# Patient Record
Sex: MALE | Race: WHITE | URBAN - METROPOLITAN AREA
[De-identification: names, ages, dates, MRNs, and addresses within clinical notes are randomized per-mention and may not be internally consistent; named-entity substitution may affect disease eponyms.]

---

## 2018-12-17 ENCOUNTER — HOSPITAL ENCOUNTER (OUTPATIENT)
Dept: ULTRASOUND IMAGING | Age: 53
Discharge: HOME | End: 2018-12-17
Payer: MEDICARE

## 2018-12-17 ENCOUNTER — HOSPITAL ENCOUNTER (OUTPATIENT)
Dept: WOMENS IMAGING | Age: 53
Discharge: HOME | End: 2018-12-17
Payer: MEDICARE

## 2018-12-17 DIAGNOSIS — N64.4 BREAST TENDERNESS IN MALE: ICD-10-CM

## 2018-12-17 DIAGNOSIS — N63.20 LEFT BREAST LUMP: ICD-10-CM

## 2018-12-17 DIAGNOSIS — N63.0 LUMP OR MASS IN BREAST: ICD-10-CM

## 2018-12-17 PROCEDURE — 76642 ULTRASOUND BREAST LIMITED: CPT

## 2018-12-17 PROCEDURE — G0279 TOMOSYNTHESIS, MAMMO: HCPCS

## 2021-02-02 PROBLEM — I10 ESSENTIAL HYPERTENSION: Status: ACTIVE | Noted: 2021-02-02

## 2021-02-02 PROBLEM — E78.5 HYPERLIPIDEMIA: Status: ACTIVE | Noted: 2021-02-02

## 2021-02-02 NOTE — PROGRESS NOTES
[facility]   Cardiac Evaluation      Patient: Mark Grimaldo  YOB: 1965         Chief Complaint   Patient presents with    New Patient        Referring provider: No primary care provider on file. History of Present Illness:  Mr Nathan Garcia is a 63 y.o. male here as a new patient. He has a PMH of Htn, Hld,  He has a family history of heart disease in his father and sister, and Htn in his sister. He is a current every day smoker. Today, Mr Nathan Garcia is here for concerns of erectile dysfunction. He wants to take Viagra/Cialis and his PCP wants cardiac evaluation prior to prescribing it. He has h/o Coronary angiogram in [place] [address] many years ago that showed mild CAD. He denies exertional chest pain, SOB/BARRERA, PND, palpitations, light-headedness, or edema. With regard to medication therapy he/she has been compliant with prescribed regimen and has tolerated therapy to date. Past Medical History:   has no past medical history on file. Surgical History:   has a past surgical history that includes Pancreas surgery; Lithotripsy; and Splenectomy, total.     Current Outpatient Medications   Medication Sig Dispense Refill    lisinopril (PRINIVIL;ZESTRIL) 10 MG tablet       vitamin D (CHOLECALCIFEROL) 250 MCG (89825 UT) CAPS capsule Take 10,000 Units by mouth       No current facility-administered medications for this visit. Allergies:  Patient has no known allergies.      Social History:  Social History     Socioeconomic History    Marital status:      Spouse name: Not on file    Number of children: Not on file    Years of education: Not on file    Highest education level: Not on file   Occupational History    Not on file   Social Needs    Financial resource strain: Not on file    Food insecurity     Worry: Not on file     Inability: Not on file    Transportation needs     Medical: Not on file     Non-medical: Not on file   Tobacco Use    Smoking status: Current Every Day Smoker     Packs/day: 0.50     Years: 35.00     Pack years: 17.50     Types: Cigarettes    Smokeless tobacco: Never Used   Substance and Sexual Activity    Alcohol use: Never     Frequency: Never     Binge frequency: Never    Drug use: Yes     Types: Marijuana    Sexual activity: Not on file   Lifestyle    Physical activity     Days per week: Not on file     Minutes per session: Not on file    Stress: Not on file   Relationships    Social connections     Talks on phone: Not on file     Gets together: Not on file     Attends Hoahaoism service: Not on file     Active member of club or organization: Not on file     Attends meetings of clubs or organizations: Not on file     Relationship status: Not on file    Intimate partner violence     Fear of current or ex partner: Not on file     Emotionally abused: Not on file     Physically abused: Not on file     Forced sexual activity: Not on file   Other Topics Concern    Not on file   Social History Narrative    Not on file       Family History:   Family History   Problem Relation Age of Onset    OCD Mother     Hypertension Mother     No Known Problems Father     No Known Problems Sister     No Known Problems Brother     No Known Problems Sister     No Known Problems Sister      Family history has been reviewed and not pertinent except as noted above. Review of Systems:   · Constitutional: there has been no unanticipated weight loss. No change in energy or activity level   · Eyes: No visual changes   · ENT: No Headaches, hearing loss or vertigo. No mouth sores or sore throat. · Cardiovascular: Reviewed in HPI  · Respiratory: No cough or wheezing, no sputum production. · Gastrointestinal: No abdominal pain, appetite loss, blood in stools. No change in bowel or bladder habits. · Genitourinary: No nocturia, dysuria, trouble voiding  · Musculoskeletal:  No gait disturbance, weakness or joint complaints.   · Integumentary: No rash or pruritis. · Neurological: No headache, change in muscle strength, numbness or tingling. No change in gait, balance, coordination, mood, affect, memory, mentation, behavior. · Psychiatric: No anxiety or depression  · Endocrine: No malaise or fever  · Hematologic/Lymphatic: No abnormal bruising or bleeding, blood clots or swollen lymph nodes. · Allergic/Immunologic: No nasal congestion or hives. Physical Examination:    Vitals:    02/12/21 0926   BP: 126/88   Site: Right Upper Arm   Position: Sitting   Cuff Size: Medium Adult   Pulse: 85   SpO2: 95%   Weight: 145 lb (65.8 kg)   Height: 5' 10\" (1.778 m)     Body mass index is 20.81 kg/m². Wt Readings from Last 3 Encounters:   02/12/21 145 lb (65.8 kg)      BP Readings from Last 3 Encounters:   02/12/21 126/88        Physical Examination:    · CONSTITUTIONAL: Well developed, well nourished  · EYES: PERRLA. No xanthelasma, sclera non icteric  · EARS,NOSE,MOUTH,THROAT:  Mucous membranes moist, normal hearing  · NECK: Supple, JVP normal, thyroid not enlarged. Carotids 2+ without bruits  · RESPIRATORY: Normal effort, no rales or rhonchi  · CARDIOVASCULAR: Normal PMI, regular rate and rhythm, no murmurs, rub or gallop. No edema. Radial pulses present and equal  · CHEST: No scar or masses  · ABDOMEN: Normal bowel sounds. No masses or tenderness. No bruit  · MUSCULOSKELETAL: No clubbing or cyanosis. Moves all extremities well. Normal gait  · SKIN:  Warm and dry. No rashes  · NEUROLOGIC: Cranial nerves intact. Alert and oriented  · PSYCHIATRIC: Calm affect. Appears to have normal judgement and insight    All testing and labs listed below were personally reviewed by myself. Assessment/Plan  1. Erectile dysfunction, unspecified erectile dysfunction type    2. Essential hypertension    3.  Hyperlipidemia, unspecified hyperlipidemia type          Essential hypertension  Controlled  Meds~ lisinopril  Plan~ continue medications    Hyperlipidemia  LDL~ 61  (TG 74; HDL 38; )   Date of last lipid panel~ 1/2020  Meds~ untreated  Plan~ stable    Erectile dysfunction  Pt concerned for ED and pcp would like clearance prior to starting on medications. Plan~ GXT exercise only      Orders Placed This Encounter   Procedures   Kristin Magallanes MD      Thank you for allowing to me to participate in the care of [place]. Scribe's Attestation: This note was scribed in the presence of Dr. Claudia López MD by Kya Batista RN.    I, Dr. Claudia López, personally performed the services described in this documentation, as scribed by the above signed scribe in my presence. It is both accurate and complete to my knowledge. I agree with the details independently gathered by the clinical support staff, while the remaining scribed note accurately describes my personal service to the patient.

## 2021-02-12 ENCOUNTER — OFFICE VISIT (OUTPATIENT)
Dept: CARDIOLOGY CLINIC | Age: 56
End: 2021-02-12
Payer: MEDICARE

## 2021-02-12 VITALS
DIASTOLIC BLOOD PRESSURE: 88 MMHG | WEIGHT: 145 LBS | HEART RATE: 85 BPM | OXYGEN SATURATION: 95 % | BODY MASS INDEX: 20.76 KG/M2 | SYSTOLIC BLOOD PRESSURE: 126 MMHG | HEIGHT: 70 IN

## 2021-02-12 DIAGNOSIS — N52.9 ERECTILE DYSFUNCTION, UNSPECIFIED ERECTILE DYSFUNCTION TYPE: Primary | ICD-10-CM

## 2021-02-12 DIAGNOSIS — E78.5 HYPERLIPIDEMIA, UNSPECIFIED HYPERLIPIDEMIA TYPE: ICD-10-CM

## 2021-02-12 DIAGNOSIS — I10 ESSENTIAL HYPERTENSION: ICD-10-CM

## 2021-02-12 PROBLEM — I25.10 CORONARY ARTERY DISEASE INVOLVING NATIVE CORONARY ARTERY OF NATIVE HEART WITHOUT ANGINA PECTORIS: Status: RESOLVED | Noted: 2021-02-12 | Resolved: 2021-02-12

## 2021-02-12 PROBLEM — I25.10 CORONARY ARTERY DISEASE INVOLVING NATIVE CORONARY ARTERY OF NATIVE HEART WITHOUT ANGINA PECTORIS: Status: ACTIVE | Noted: 2021-02-12

## 2021-02-12 PROCEDURE — 99204 OFFICE O/P NEW MOD 45 MIN: CPT | Performed by: INTERNAL MEDICINE

## 2021-02-12 RX ORDER — LISINOPRIL 10 MG/1
TABLET ORAL
COMMUNITY
Start: 2021-01-12 | End: 2022-09-06 | Stop reason: SDUPTHER

## 2021-02-12 SDOH — HEALTH STABILITY: MENTAL HEALTH: HOW MANY STANDARD DRINKS CONTAINING ALCOHOL DO YOU HAVE ON A TYPICAL DAY?: NOT ASKED

## 2021-02-12 NOTE — ASSESSMENT & PLAN NOTE
Pt concerned for ED and pcp would like clearance prior to starting on medications. History of non obstructive CAD. Can achieve>4mets.      Plan~ GXT exercise only

## 2021-02-19 ENCOUNTER — TELEPHONE (OUTPATIENT)
Dept: CARDIOLOGY CLINIC | Age: 56
End: 2021-02-19

## 2021-02-19 NOTE — TELEPHONE ENCOUNTER
Central sched states cardiac stress test needs a new order placed with new dx. States one listed is not being accepted by  ins. Any questions please call.

## 2021-02-22 DIAGNOSIS — R06.02 SOB (SHORTNESS OF BREATH): Primary | ICD-10-CM

## 2021-06-21 ENCOUNTER — OFFICE VISIT (OUTPATIENT)
Dept: PRIMARY CARE CLINIC | Age: 56
End: 2021-06-21
Payer: MEDICARE

## 2021-06-21 VITALS
HEIGHT: 70 IN | OXYGEN SATURATION: 96 % | SYSTOLIC BLOOD PRESSURE: 130 MMHG | BODY MASS INDEX: 20.16 KG/M2 | WEIGHT: 140.8 LBS | DIASTOLIC BLOOD PRESSURE: 74 MMHG | HEART RATE: 62 BPM | TEMPERATURE: 97.1 F

## 2021-06-21 DIAGNOSIS — I73.9 CLAUDICATION (HCC): ICD-10-CM

## 2021-06-21 DIAGNOSIS — L84 CORN OR CALLUS: ICD-10-CM

## 2021-06-21 DIAGNOSIS — I10 ESSENTIAL HYPERTENSION, BENIGN: Primary | ICD-10-CM

## 2021-06-21 PROBLEM — N52.9 ERECTILE DYSFUNCTION: Status: RESOLVED | Noted: 2021-02-12 | Resolved: 2021-06-21

## 2021-06-21 PROCEDURE — 99212 OFFICE O/P EST SF 10 MIN: CPT | Performed by: FAMILY MEDICINE

## 2021-06-21 RX ORDER — ASPIRIN 81 MG/1
81 TABLET ORAL DAILY
COMMUNITY

## 2021-06-21 RX ORDER — AMLODIPINE BESYLATE 10 MG/1
10 TABLET ORAL DAILY
COMMUNITY
End: 2022-09-06 | Stop reason: SDUPTHER

## 2021-06-21 RX ORDER — ATORVASTATIN CALCIUM 80 MG/1
80 TABLET, FILM COATED ORAL DAILY
COMMUNITY
Start: 2021-04-06 | End: 2022-09-23 | Stop reason: SDUPTHER

## 2021-06-21 ASSESSMENT — LIFESTYLE VARIABLES: HOW OFTEN DO YOU HAVE A DRINK CONTAINING ALCOHOL: 0

## 2021-06-21 ASSESSMENT — PATIENT HEALTH QUESTIONNAIRE - PHQ9
SUM OF ALL RESPONSES TO PHQ QUESTIONS 1-9: 0
SUM OF ALL RESPONSES TO PHQ QUESTIONS 1-9: 0
2. FEELING DOWN, DEPRESSED OR HOPELESS: 0
SUM OF ALL RESPONSES TO PHQ QUESTIONS 1-9: 0
1. LITTLE INTEREST OR PLEASURE IN DOING THINGS: 0
SUM OF ALL RESPONSES TO PHQ9 QUESTIONS 1 & 2: 0

## 2021-06-21 NOTE — PROGRESS NOTES
PROGRESS NOTE  Date of Service:  6/21/2021    Chief Complaint   Patient presents with    Hypertension     follow-up        SUBJECTIVE:  Patient ID: Chas Mendez is a 63 y.o. male to reestablish care with me. Has history of hypertension, hyperlipidemia, nonobstructive CAD and COPD. Still smokes a pack a day. HPI:   Patient is 67-year-old here for follow-up. Being treated for hypertension and hyperlipidemia. Also has nonobstructive CAD and COPD. Still smokes and no desire to quit at this time. Recently seen his cardiologist on 5/20/2021 and cardiac stable. Recommend to resume medications. Patient been complaining of pain at the bottom of his right foot. Not much exercise due to pain with ambulation. Patient has not been checked for PAD. Denies chest pains or shortness of breath. Has off-and-on cough due to his COPD and smoking. 2D echo done on 5/18/2021: Estimated ejection fraction 55 to 60%, wall motion was normal.  Mild mitral valve and aortic valve regurgitation.     Reviewed blood test done on 7/3/2020: Glucose 98, creatinine 1.03, estimated GFR 81, calcium 8.8, vitamin D 18.2  Blood test done on January 6, 2020: CBC within normal limits, glucose 94, creatinine 1.02, potassium 4.3, calcium 8.8, total cholesterol 114, triglycerides 74, HDL 38, LDL 61, TSH 1.75    Past Medical History:   Diagnosis Date    Acute pancreatitis 2005    Breast mass, left 12/17/2018    Chronic low back pain     History of chicken pox     Hyperlipidemia     Hypertension     Kidney stones 1990      Past Surgical History:   Procedure Laterality Date    CYSTOSCOPY  04/12/2012    With right ureteral stent due to kidney stone by Dr. 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 Return in about 3 weeks (around 7/12/2021). Electronically signed by Meg Bishop MD on 6/21/21 at 5:33 PM.     This dictation was generated by voice recognition computer software. Although all attempts are made to edit the dictation for accuracy, there may be errors in the transcription that are not intended.

## 2021-07-12 ENCOUNTER — OFFICE VISIT (OUTPATIENT)
Dept: PRIMARY CARE CLINIC | Age: 56
End: 2021-07-12
Payer: MEDICARE

## 2021-07-12 VITALS
HEIGHT: 71 IN | BODY MASS INDEX: 19.8 KG/M2 | HEART RATE: 74 BPM | OXYGEN SATURATION: 96 % | TEMPERATURE: 97.1 F | WEIGHT: 141.4 LBS | SYSTOLIC BLOOD PRESSURE: 134 MMHG | DIASTOLIC BLOOD PRESSURE: 74 MMHG

## 2021-07-12 DIAGNOSIS — E55.9 VITAMIN D DEFICIENCY: ICD-10-CM

## 2021-07-12 DIAGNOSIS — I10 ESSENTIAL HYPERTENSION: ICD-10-CM

## 2021-07-12 DIAGNOSIS — Z72.89 OTHER PROBLEMS RELATED TO LIFESTYLE: ICD-10-CM

## 2021-07-12 DIAGNOSIS — E78.2 MIXED HYPERLIPIDEMIA: ICD-10-CM

## 2021-07-12 DIAGNOSIS — Z00.00 MEDICARE ANNUAL WELLNESS VISIT, SUBSEQUENT: Primary | ICD-10-CM

## 2021-07-12 DIAGNOSIS — Z00.00 MEDICARE ANNUAL WELLNESS VISIT, SUBSEQUENT: ICD-10-CM

## 2021-07-12 LAB
A/G RATIO: 1.7 (ref 1.1–2.2)
ALBUMIN SERPL-MCNC: 3.9 G/DL (ref 3.4–5)
ALP BLD-CCNC: 60 U/L (ref 40–129)
ALT SERPL-CCNC: 12 U/L (ref 10–40)
ANION GAP SERPL CALCULATED.3IONS-SCNC: 10 MMOL/L (ref 3–16)
AST SERPL-CCNC: 23 U/L (ref 15–37)
BASOPHILS ABSOLUTE: 0.1 K/UL (ref 0–0.2)
BASOPHILS RELATIVE PERCENT: 0.6 %
BILIRUB SERPL-MCNC: 0.3 MG/DL (ref 0–1)
BUN BLDV-MCNC: 11 MG/DL (ref 7–20)
CALCIUM SERPL-MCNC: 8.9 MG/DL (ref 8.3–10.6)
CHLORIDE BLD-SCNC: 108 MMOL/L (ref 99–110)
CHOLESTEROL, TOTAL: 125 MG/DL (ref 0–199)
CO2: 25 MMOL/L (ref 21–32)
CREAT SERPL-MCNC: 0.9 MG/DL (ref 0.9–1.3)
EOSINOPHILS ABSOLUTE: 0.1 K/UL (ref 0–0.6)
EOSINOPHILS RELATIVE PERCENT: 1.1 %
GFR AFRICAN AMERICAN: >60
GFR NON-AFRICAN AMERICAN: >60
GLOBULIN: 2.3 G/DL
GLUCOSE BLD-MCNC: 99 MG/DL (ref 70–99)
HCT VFR BLD CALC: 42.9 % (ref 40.5–52.5)
HDLC SERPL-MCNC: 44 MG/DL (ref 40–60)
HEMOGLOBIN: 14.4 G/DL (ref 13.5–17.5)
LDL CHOLESTEROL CALCULATED: 64 MG/DL
LYMPHOCYTES ABSOLUTE: 2.5 K/UL (ref 1–5.1)
LYMPHOCYTES RELATIVE PERCENT: 27.9 %
MCH RBC QN AUTO: 33.5 PG (ref 26–34)
MCHC RBC AUTO-ENTMCNC: 33.4 G/DL (ref 31–36)
MCV RBC AUTO: 100.1 FL (ref 80–100)
MONOCYTES ABSOLUTE: 0.9 K/UL (ref 0–1.3)
MONOCYTES RELATIVE PERCENT: 9.9 %
NEUTROPHILS ABSOLUTE: 5.4 K/UL (ref 1.7–7.7)
NEUTROPHILS RELATIVE PERCENT: 60.5 %
PDW BLD-RTO: 15.1 % (ref 12.4–15.4)
PLATELET # BLD: 194 K/UL (ref 135–450)
PMV BLD AUTO: 11.5 FL (ref 5–10.5)
POTASSIUM SERPL-SCNC: 3.9 MMOL/L (ref 3.5–5.1)
RBC # BLD: 4.29 M/UL (ref 4.2–5.9)
SODIUM BLD-SCNC: 143 MMOL/L (ref 136–145)
TOTAL PROTEIN: 6.2 G/DL (ref 6.4–8.2)
TRIGL SERPL-MCNC: 84 MG/DL (ref 0–150)
VITAMIN D 25-HYDROXY: 33.6 NG/ML
VLDLC SERPL CALC-MCNC: 17 MG/DL
WBC # BLD: 8.9 K/UL (ref 4–11)

## 2021-07-12 PROCEDURE — G0439 PPPS, SUBSEQ VISIT: HCPCS | Performed by: FAMILY MEDICINE

## 2021-07-12 ASSESSMENT — PATIENT HEALTH QUESTIONNAIRE - PHQ9
SUM OF ALL RESPONSES TO PHQ QUESTIONS 1-9: 0
1. LITTLE INTEREST OR PLEASURE IN DOING THINGS: 0
2. FEELING DOWN, DEPRESSED OR HOPELESS: 0
SUM OF ALL RESPONSES TO PHQ9 QUESTIONS 1 & 2: 0
SUM OF ALL RESPONSES TO PHQ QUESTIONS 1-9: 0
SUM OF ALL RESPONSES TO PHQ QUESTIONS 1-9: 0

## 2021-07-12 ASSESSMENT — LIFESTYLE VARIABLES: HOW OFTEN DO YOU HAVE A DRINK CONTAINING ALCOHOL: 0

## 2021-07-12 NOTE — PATIENT INSTRUCTIONS
Personalized Preventive Plan for Karolyn Barbosa - 7/12/2021  Medicare offers a range of preventive health benefits. Some of the tests and screenings are paid in full while other may be subject to a deductible, co-insurance, and/or copay. Some of these benefits include a comprehensive review of your medical history including lifestyle, illnesses that may run in your family, and various assessments and screenings as appropriate. After reviewing your medical record and screening and assessments performed today your provider may have ordered immunizations, labs, imaging, and/or referrals for you. A list of these orders (if applicable) as well as your Preventive Care list are included within your After Visit Summary for your review. Other Preventive Recommendations:    · A preventive eye exam performed by an eye specialist is recommended every 1-2 years to screen for glaucoma; cataracts, macular degeneration, and other eye disorders. · A preventive dental visit is recommended every 6 months. · Try to get at least 150 minutes of exercise per week or 10,000 steps per day on a pedometer . · Order or download the FREE \"Exercise & Physical Activity: Your Everyday Guide\" from [employer]. Call 5-406.814.1573 or search [employer] online. · You need 7855-3093 mg of calcium and 9221-1500 IU of vitamin D per day. It is possible to meet your calcium requirement with diet alone, but a vitamin D supplement is usually necessary to meet this goal.  · When exposed to the sun, use a sunscreen that protects against both UVA and UVB radiation with an SPF of 30 or greater. Reapply every 2 to 3 hours or after sweating, drying off with a towel, or swimming. · Always wear a seat belt when traveling in a car. Always wear a helmet when riding a bicycle or motorcycle.

## 2021-07-12 NOTE — PROGRESS NOTES
2005       Family History   Problem Relation Age of Onset    Hypertension Mother     Diabetes Mother     COPD Mother     No Known Problems Father     No Known Problems Sister     No Known Problems Sister     No Known Problems Sister     No Known Problems Brother        CareTeam (Including outside providers/suppliers regularly involved in providing care):   Patient Care Team:  Sabino Perry MD as PCP - General (Family Medicine)  Sabino Perry MD as PCP - [facility] Empaneled Provider  Vandana Cheung MD as Consulting Physician (Cardiology)    Wt Readings from Last 3 Encounters:   07/12/21 141 lb 6.4 oz (64.1 kg)   06/21/21 140 lb 12.8 oz (63.9 kg)   02/12/21 145 lb (65.8 kg)     /74 (Site: Left Upper Arm, Position: Sitting, Cuff Size: Medium Adult)   Pulse 74   Temp 97.1 °F (36.2 °C) (Infrared)   Ht 5' 11\" (1.803 m)   Wt 141 lb 6.4 oz (64.1 kg)   SpO2 96%   BMI 19.72 kg/m²      Based upon direct observation of the patient, evaluation of cognition reveals recent and remote memory intact. Review of Systems    Physical Exam     Patient's complete Health Risk Assessment and screening values have been reviewed and are found in Flowsheets. The following problems were reviewed today and where indicated follow up appointments were made and/or referrals ordered. Positive Risk Factor Screenings with Interventions:      Cognitive: Words recalled: 1 Word Recalled  Clock Drawing Test (CDT) Score: (!) Abnormal  Total Score Interpretation: Positive Mini-Cog  Did the patient refuse to take the cognition test?: No  Cognitive Impairment Interventions:  · Memory still intact, no intervention needed at this time. We will continue to monitor.       Substance History:  Social History     Tobacco History     Smoking Status  Current Every Day Smoker Smoking Frequency  0.5 packs/day for 35 years (17.5 pk yrs) Smoking Tobacco Type  Cigarettes    Smokeless Tobacco Use  Never Used          Alcohol History     Alcohol Use Status  Never          Drug Use     Drug Use Status  Yes Types  Marijuana          Sexual Activity     Sexually Active  Not Asked               Alcohol Screening:       A score of 8 or more is associated with harmful or hazardous drinking. A score of 13 or more in women, and 15 or more in men, is likely to indicate alcohol dependence. Substance Abuse Interventions:  · Patient is working on quitting smoking soon. General Health and ACP:  General  In general, how would you say your health is?: Fair  In the past 7 days, have you experienced any of the following? New or Increased Pain, New or Increased Fatigue, Loneliness, Social Isolation, Stress or Anger?: None of These  Do you get the social and emotional support that you need?: Yes  Do you have a Living Will?: (!) No  Advance Directives     Power of [facility] Will ACP-Advance Directive ACP-Power of     Not on File Not on File Not on File Not on File      General Health Risk Interventions:  · Gets physical, social and emotional support from family especially his wife. Health Habits/Nutrition:  Health Habits/Nutrition  Do you exercise for at least 20 minutes 2-3 times per week?: Yes  Have you lost any weight without trying in the past 3 months?: No  Do you eat only one meal per day?: No  Have you seen the dentist within the past year?: (!) No  Body mass index: 19.72  Health Habits/Nutrition Interventions:  · Not much activity due to pain. Hearing/Vision:  No exam data present  Hearing/Vision  Do you or your family notice any trouble with your hearing that hasn't been managed with hearing aids?: No  Do you have difficulty driving, watching TV, or doing any of your daily activities because of your eyesight?: (!) Yes  Have you had an eye exam within the past year?: (!) No  Hearing/Vision Interventions:  · No hearing and vision concerns other than more likely just need reading glasses.         Personalized Preventive Plan   Current Health Maintenance Status  Immunization History   Administered Date(s) Administered    COVID-19, Moderna, PF, 100mcg/0.5mL 07/01/2021    Influenza Virus Vaccine 10/20/2010, 11/23/2016, 01/12/2021    Influenza, Quadv, IM, PF (6 mo and older Fluzone, Flulaval, Fluarix, and 3 yrs and older Afluria) 12/05/2017    Pneumococcal Conjugate 13-valent ([id number]) 12/05/2017    Pneumococcal Polysaccharide ([id number]) 10/05/2010, 10/20/2010    Tdap (Boostrix, Adacel) 01/29/2008, 12/14/2013        Health Maintenance   Topic Date Due    Potassium monitoring  Never done    Creatinine monitoring  Never done    Hepatitis C screen  Never done    Meningococcal (ACWY) vaccine (1 - Risk start before 7 months 4-dose series) Never done    Hib vaccine (1 of 1 - Risk 1-dose series) Never done    Lipid screen  Never done    Meningococcal B vaccine (1 of 4 - Increased Risk Bexsero 2-dose series) Never done    HIV screen  Never done    Colon cancer screen colonoscopy  Never done    Shingles Vaccine (1 of 2) Never done   [employer] Visit (AWV)  Never done    COVID-19 Vaccine (2 - Moderna 2-dose series) 07/29/2021    Flu vaccine (1) 09/01/2021    DTaP/Tdap/Td vaccine (3 - Td or Tdap) 12/14/2023    Pneumococcal 0-64 years Vaccine (4 of 4 - PPSV23) 12/04/2029    Hepatitis A vaccine  Aged Out    Hepatitis B vaccine  Aged Out     Recommendations for [employer] Due: see orders and patient instructions/AVS.    1. Medicare annual wellness visit, subsequent    - CBC Auto Differential; Future  - Comprehensive Metabolic Panel; Future  - Lipid Panel; Future    2. Essential hypertension  Continue current medications. Discussed goal of blood pressure to keep it under 140/90. On amlodipine 10 mg daily and lisinopril 10 mg daily. - Comprehensive Metabolic Panel; Future    3. Mixed hyperlipidemia  Takes Lipitor 80 mg daily. Goal of LDL to keep under 80.  - Comprehensive Metabolic Panel; Future  - Lipid Panel; Future    4. Vitamin D deficiency  Takes vitamin D supplement 10,000 units daily. Check level to make sure it is not overcorrected. Had hepatitis C antibody screen on 5/23/2017 and that was negative. - Vitamin D 25 Hydroxy; Future    5. Other problems related to lifestyle  HIV check. - [place] HEP C SCREEN HIGH RISK/OTHER []  - HIV Screen; Future    Medications reviewed and updated. Will call with test results. Continue current medications. Follow-up in 6 months for hypertension, follow-up in a year for Medicare annual wellness. Return in 6 months (on 1/12/2022) for Medicare Annual Wellness Visit in 1 year.     Recommended screening schedule for the next 5-10 years is provided to the patient in written form: see Patient Instructions/AVS.    Electronically signed by Bhakti Pierson MD on 7/12/2021 at 12:35 PM.

## 2021-07-13 LAB
HIV AG/AB: NORMAL
HIV ANTIGEN: NORMAL
HIV-1 ANTIBODY: NORMAL
HIV-2 AB: NORMAL

## 2021-07-16 DIAGNOSIS — I10 ESSENTIAL HYPERTENSION: ICD-10-CM

## 2021-07-16 DIAGNOSIS — E55.9 VITAMIN D DEFICIENCY: Primary | ICD-10-CM

## 2021-07-16 NOTE — TELEPHONE ENCOUNTER
Please sign and send to pharmacy. . PT requested Vitamin D refill. Requested Prescriptions     Pending Prescriptions Disp Refills    vitamin D (CHOLECALCIFEROL) 250 MCG (47649 UT) CAPS capsule 30 capsule 0     Sig: Take 1 capsule by mouth daily   .

## 2022-09-06 DIAGNOSIS — I10 ESSENTIAL HYPERTENSION: ICD-10-CM

## 2022-09-06 RX ORDER — AMLODIPINE BESYLATE 10 MG/1
10 TABLET ORAL DAILY
Qty: 30 TABLET | Refills: 3 | Status: SHIPPED | OUTPATIENT
Start: 2022-09-06 | End: 2022-09-09

## 2022-09-06 RX ORDER — LISINOPRIL 10 MG/1
10 TABLET ORAL DAILY
Qty: 30 TABLET | Refills: 3 | Status: SHIPPED | OUTPATIENT
Start: 2022-09-06 | End: 2022-09-09 | Stop reason: SDUPTHER

## 2022-09-06 NOTE — TELEPHONE ENCOUNTER
Pt is calling asking if he would need to be seen or if he could get a medication called in for his blood pressure  pt states he has high blood pressure and has ran out of medication last week   Pt does not recall the name of the medication he usually takes

## 2022-09-06 NOTE — TELEPHONE ENCOUNTER
Send a prescription for lisinopril 10 mg daily and amlodipine 10 mg daily for 30 days and he is due for follow-up, last seen July 2021.

## 2022-09-09 ENCOUNTER — OFFICE VISIT (OUTPATIENT)
Dept: PRIMARY CARE CLINIC | Age: 57
End: 2022-09-09
Payer: MEDICARE

## 2022-09-09 VITALS
HEART RATE: 87 BPM | DIASTOLIC BLOOD PRESSURE: 60 MMHG | BODY MASS INDEX: 18.69 KG/M2 | WEIGHT: 134 LBS | SYSTOLIC BLOOD PRESSURE: 102 MMHG | TEMPERATURE: 98.1 F | OXYGEN SATURATION: 96 %

## 2022-09-09 DIAGNOSIS — I73.9 CLAUDICATION OF BOTH LOWER EXTREMITIES (HCC): ICD-10-CM

## 2022-09-09 DIAGNOSIS — Z00.00 MEDICARE ANNUAL WELLNESS VISIT, SUBSEQUENT: Primary | ICD-10-CM

## 2022-09-09 DIAGNOSIS — I10 ESSENTIAL HYPERTENSION: ICD-10-CM

## 2022-09-09 DIAGNOSIS — E78.2 MIXED HYPERLIPIDEMIA: ICD-10-CM

## 2022-09-09 PROCEDURE — G0439 PPPS, SUBSEQ VISIT: HCPCS | Performed by: FAMILY MEDICINE

## 2022-09-09 RX ORDER — LISINOPRIL 10 MG/1
10 TABLET ORAL DAILY
Qty: 90 TABLET | Refills: 3 | Status: SHIPPED | OUTPATIENT
Start: 2022-09-09

## 2022-09-09 ASSESSMENT — PATIENT HEALTH QUESTIONNAIRE - PHQ9
SUM OF ALL RESPONSES TO PHQ9 QUESTIONS 1 & 2: 0
SUM OF ALL RESPONSES TO PHQ QUESTIONS 1-9: 0
2. FEELING DOWN, DEPRESSED OR HOPELESS: 0
SUM OF ALL RESPONSES TO PHQ QUESTIONS 1-9: 0
1. LITTLE INTEREST OR PLEASURE IN DOING THINGS: 0

## 2022-09-09 ASSESSMENT — LIFESTYLE VARIABLES
HOW MANY STANDARD DRINKS CONTAINING ALCOHOL DO YOU HAVE ON A TYPICAL DAY: PATIENT DOES NOT DRINK
HOW OFTEN DO YOU HAVE A DRINK CONTAINING ALCOHOL: NEVER

## 2022-09-09 NOTE — PROGRESS NOTES
Chief Complaint   Patient presents with    Medicare [address]     Medicare Annual Wellness Visit  Name: Endy Lacey Date: 2022   MRN: [id number] Sex: Male   Age: 61 y.o. Ethnicity: Non- / Non    : 1965 Race: White (non-)      Man Schreiber is here for Medicare AWV     Patient is 58-year-old male here for his Medicare AWV. He is being treated for hyperlipidemia and hypertension. Takes lisinopril and aspirin. Took himself off Lipitor a year ago. Patient continues to have pain on his leg with ambulation and at rest.  Had RICHAR done on 2021: 0.55 on both legs. Patient was not able to follow-up sooner, his mother  Dashawn Barbosa) in 2021. Reviewed blood test done on 2021: Total cholesterol 125, triglyceride 84, HDL 44, LDL 64, vitamin D33.6, HIV nonreactive, sodium 143, potassium 3.9, chloride 108, glucose 99, BUN 11, creatinine 0.9, calcium 8.9, ALT 12, AST 23, CBC unremarkable    Screenings for behavioral, psychosocial and functional/safety risks, and cognitive dysfunction are all negative except as indicated below. These results, as well as other patient data from the [address] form, are documented in Flowsheets linked to this Encounter. Patient is a 58-year-old male here for Medicare annual physical. Compliant with current medication. Patient happy to tell me that he is planning to quit smoking soon. He has slowed down to 2 to 3 cigarettes a day. Scheduled for RICHAR test in August. Denies new symptoms. Allergies   Allergen Reactions    Bee Venom Swelling and Other (See Comments)     Swelling and sweats.         Current Outpatient Medications   Medication Sig Dispense Refill    lisinopril (PRINIVIL;ZESTRIL) 10 MG tablet Take 1 tablet by mouth daily 90 tablet 3    aspirin 81 MG EC tablet Take 81 mg by mouth daily      atorvastatin (LIPITOR) 80 MG tablet Take 80 mg by mouth daily (Patient not taking: Reported on 2022)       No current facility-administered medications for this visit. Past Medical History:   Diagnosis Date    Acute pancreatitis 2005    Breast mass, left 12/17/2018    Chronic low back pain     History of chicken pox     Hyperlipidemia     Hypertension     Kidney stones 1990       Past Surgical History:   Procedure Laterality Date    CYSTOSCOPY  04/12/2012    With right ureteral stent due to kidney stone by Dr. 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 Empaneled Provider  Danny Alexandra MD as Consulting Physician (Cardiology)    Wt Readings from Last 3 Encounters:   09/09/22 134 lb (60.8 kg)   07/12/21 141 lb 6.4 oz (64.1 kg)   06/21/21 140 lb 12.8 oz (63.9 kg)     /60   Pulse 87   Temp 98.1 °F (36.7 °C) (Infrared)   Wt 134 lb (60.8 kg)   SpO2 96%   BMI 18.69 kg/m²      Based upon direct observation of the patient, evaluation of cognition reveals recent and remote memory intact. Review of Systems    Physical Exam     Patient's complete Health Risk Assessment and screening values have been reviewed and are found in Flowsheets. The following problems were reviewed today and where indicated follow up appointments were made and/or referrals ordered.     Positive Risk Factor Screenings with Interventions:     Fall Risk:  Do you feel unsteady or are you worried about falling? : no  2 or more falls in past year?: (!) yes  Fall with injury in past year?: (!) yes      Cognitive Impairment Interventions:  Memory still intact, no intervention needed at this time. We will continue to monitor. Substance History:  Social History       Tobacco History       Smoking Status  Some Days Smoking Frequency  0.50 packs/day for 35.00 years (17.50 pk-yrs) Smoking Tobacco Type  Cigarettes      Smokeless Tobacco Use  Never              Alcohol History       Alcohol Use Status  Never              Drug Use       Drug Use Status  Yes Types  Marijuana (Carliss Stair)              Sexual Activity       Sexually Active  Not Asked                   Alcohol Screening:       A score of 8 or more is associated with harmful or hazardous drinking. A score of 13 or more in women, and 15 or more in men, is likely to indicate alcohol dependence. Substance Abuse Interventions:  Patient quit smoking. General Health and ACP:  General  In general, how would you say your health is?: Fair  In the past 7 days, have you experienced any of the following: New or Increased Pain, New or Increased Fatigue, Loneliness, Social Isolation, Stress or Anger?: No  Do you get the social and emotional support that you need?: Yes  Do you have a Living Will?: (!) No    Advance Directives       Power of [address] [place] [address] Will ACP-Advance Directive ACP-Power of     Not on File Not on File Not on File Not on File        General Health Risk Interventions:  Gets physical, social and emotional support from family especially his wife. Health Habits/Nutrition:  Physical Activity: Sufficiently Active    Days of Exercise per Week: 7 days    Minutes of Exercise per Session: 30 min     Have you lost any weight without trying in the past 3 months?: (!) Yes     Have you seen the dentist within the past year?: (!) No  Health Habits/Nutrition Interventions:  Not much activity due to pain.     Hearing/Vision:  Do you or your family notice any trouble with your hearing that hasn't been managed with hearing aids?: No  Do you have difficulty driving, watching TV, or doing any of your daily activities because of your eyesight?: No  Have you had an eye exam within the past year?: (!) No  No results found. Hearing/Vision Interventions:  No hearing and vision concerns other than more likely just need reading glasses.     Safety:  Do you have working smoke detectors?: Yes  Do you have any tripping hazards - loose or unsecured carpets or rugs?: (!) Yes  Do you have any tripping hazards - clutter in doorways, halls, or stairs?: No  Do you have either shower bars, grab bars, non-slip mats or non-slip surfaces in your shower or bathtub?: (!) No  Do all of your stairways have a railing or banister?: Not Applicable  Do you always fasten your seatbelt when you are in a car?: (!) No     Personalized Preventive Plan   Current Health Maintenance Status  Immunization History   Administered Date(s) Administered    COVID-19, MODERNA BLUE border, Primary or Immunocompromised, (age 12y+), IM, 100 mcg/0.5mL 07/01/2021, 08/05/2021    Influenza Virus Vaccine 10/20/2010, 11/23/2016, 01/12/2021    Influenza, FLUARIX, FLULAVAL, FLUZONE (age 10 mo+) AND AFLURIA, (age 1 y+), PF, 0.5mL 12/05/2017    Pneumococcal Conjugate 13-valent ([id number]) 12/05/2017    Pneumococcal Polysaccharide ([id number]) 10/05/2010, 10/20/2010    Tdap (Boostrix, Adacel) 01/29/2008, 12/14/2013        Health Maintenance   Topic Date Due    Meningococcal (ACWY) vaccine (1 - Risk start 2-23 months series) Never done    Hib vaccine (1 of 1 - Risk 1-dose series) Never done    Meningococcal B vaccine (1 of 4 - Increased Risk Bexsero 2-dose series) Never done    Shingles vaccine (1 of 2) Never done    Colorectal Cancer Screen  Never done    COVID-19 Vaccine (3 - Booster for Moderna series) 01/05/2022    Lipids  07/12/2022    Flu vaccine (1) 09/01/2022    Depression Screen  09/09/2023    Annual Wellness Visit (AWV)  09/10/2023    DTaP/Tdap/Td vaccine (3 - Td or Tdap) 12/14/2023    Pneumococcal 0-64 years Vaccine (4 - PPSV23 or PCV20) 12/04/2029    Hepatitis C screen  Completed    HIV screen  Completed    Hepatitis A vaccine  Aged Out    Hepatitis B vaccine  Aged Out     Recommendations for Preventive Services Due: see orders and patient instructions/AVS.      1. Medicare annual wellness visit, subsequent  Recommend to get COVID-vaccine booster. Will need meningitis vaccination due to history of splenectomy. - CBC with Auto Differential; Future  - Comprehensive Metabolic Panel; Future  - Lipid Panel; Future    2. Essential hypertension  Blood pressure stable at 102/60. Goal is to keep it under 140/90. Continue lisinopril 10 mg daily. - Comprehensive Metabolic Panel; Future  - lisinopril (PRINIVIL;ZESTRIL) 10 MG tablet; Take 1 tablet by mouth daily  Dispense: 90 tablet; Refill: 3    3. Mixed hyperlipidemia  Off Lipitor (80 mg) for about a year. Aware that goal of LDL is less than 80 due to PAD/claudication.  - External Referral To Vascular Surgery  - Lipid Panel; Future    4. Claudication of both lower extremities (HCC)  RICHAR 0.55 both legs. Referred to vascular surgeon for evaluation.  - External Referral To Vascular Surgery    Medications reviewed and updated. Will call with test results. Continue current medications. Return in 4 months (on 1/9/2023) for HTN/cholesterol f/u.     Recommended screening schedule for the next 5-10 years is provided to the patient in written form: see Patient Instructions/AVS.    Electronically signed by Yi Ferrell MD on 9/9/2022 at 6:00 PM.

## 2022-09-22 DIAGNOSIS — I10 ESSENTIAL HYPERTENSION: ICD-10-CM

## 2022-09-22 DIAGNOSIS — Z00.00 MEDICARE ANNUAL WELLNESS VISIT, SUBSEQUENT: ICD-10-CM

## 2022-09-22 DIAGNOSIS — I73.9 PAD (PERIPHERAL ARTERY DISEASE) (HCC): Primary | ICD-10-CM

## 2022-09-22 DIAGNOSIS — E78.2 MIXED HYPERLIPIDEMIA: ICD-10-CM

## 2022-09-22 LAB
A/G RATIO: 2.6 (ref 1.1–2.2)
ALBUMIN SERPL-MCNC: 4.1 G/DL (ref 3.4–5)
ALP BLD-CCNC: 51 U/L (ref 40–129)
ALT SERPL-CCNC: 10 U/L (ref 10–40)
ANION GAP SERPL CALCULATED.3IONS-SCNC: 11 MMOL/L (ref 3–16)
AST SERPL-CCNC: 13 U/L (ref 15–37)
BASOPHILS ABSOLUTE: 0 K/UL (ref 0–0.2)
BASOPHILS RELATIVE PERCENT: 0.4 %
BILIRUB SERPL-MCNC: 0.4 MG/DL (ref 0–1)
BUN BLDV-MCNC: 14 MG/DL (ref 7–20)
CALCIUM SERPL-MCNC: 9.1 MG/DL (ref 8.3–10.6)
CHLORIDE BLD-SCNC: 107 MMOL/L (ref 99–110)
CHOLESTEROL, TOTAL: 189 MG/DL (ref 0–199)
CO2: 23 MMOL/L (ref 21–32)
CREAT SERPL-MCNC: 1.2 MG/DL (ref 0.9–1.3)
EOSINOPHILS ABSOLUTE: 0.1 K/UL (ref 0–0.6)
EOSINOPHILS RELATIVE PERCENT: 0.9 %
GFR AFRICAN AMERICAN: >60
GFR NON-AFRICAN AMERICAN: >60
GLUCOSE BLD-MCNC: 97 MG/DL (ref 70–99)
HCT VFR BLD CALC: 43.8 % (ref 40.5–52.5)
HDLC SERPL-MCNC: 52 MG/DL (ref 40–60)
HEMOGLOBIN: 14.3 G/DL (ref 13.5–17.5)
LDL CHOLESTEROL CALCULATED: 109 MG/DL
LYMPHOCYTES ABSOLUTE: 1.8 K/UL (ref 1–5.1)
LYMPHOCYTES RELATIVE PERCENT: 16.4 %
MCH RBC QN AUTO: 33.1 PG (ref 26–34)
MCHC RBC AUTO-ENTMCNC: 32.7 G/DL (ref 31–36)
MCV RBC AUTO: 101 FL (ref 80–100)
MONOCYTES ABSOLUTE: 0.8 K/UL (ref 0–1.3)
MONOCYTES RELATIVE PERCENT: 7.1 %
NEUTROPHILS ABSOLUTE: 8.4 K/UL (ref 1.7–7.7)
NEUTROPHILS RELATIVE PERCENT: 75.2 %
PDW BLD-RTO: 15 % (ref 12.4–15.4)
PLATELET # BLD: 194 K/UL (ref 135–450)
PMV BLD AUTO: 11 FL (ref 5–10.5)
POTASSIUM SERPL-SCNC: 4.3 MMOL/L (ref 3.5–5.1)
RBC # BLD: 4.33 M/UL (ref 4.2–5.9)
SODIUM BLD-SCNC: 141 MMOL/L (ref 136–145)
TOTAL PROTEIN: 5.7 G/DL (ref 6.4–8.2)
TRIGL SERPL-MCNC: 141 MG/DL (ref 0–150)
VLDLC SERPL CALC-MCNC: 28 MG/DL
WBC # BLD: 11.1 K/UL (ref 4–11)

## 2022-09-23 PROBLEM — I73.9 PAD (PERIPHERAL ARTERY DISEASE) (HCC): Status: ACTIVE | Noted: 2022-09-23

## 2022-09-23 RX ORDER — ATORVASTATIN CALCIUM 40 MG/1
40 TABLET, FILM COATED ORAL DAILY
Qty: 90 TABLET | Refills: 3 | Status: SHIPPED | OUTPATIENT
Start: 2022-09-23 | End: 2023-09-23

## 2023-01-26 ENCOUNTER — OFFICE VISIT (OUTPATIENT)
Dept: PRIMARY CARE CLINIC | Age: 58
End: 2023-01-26

## 2023-01-26 VITALS
BODY MASS INDEX: 19.25 KG/M2 | HEART RATE: 78 BPM | OXYGEN SATURATION: 97 % | DIASTOLIC BLOOD PRESSURE: 72 MMHG | WEIGHT: 138 LBS | SYSTOLIC BLOOD PRESSURE: 118 MMHG

## 2023-01-26 DIAGNOSIS — F17.200 CURRENT SMOKER: ICD-10-CM

## 2023-01-26 DIAGNOSIS — I73.9 CLAUDICATION OF BOTH LOWER EXTREMITIES (HCC): ICD-10-CM

## 2023-01-26 DIAGNOSIS — Z01.818 PREOP EXAMINATION: Primary | ICD-10-CM

## 2023-01-26 DIAGNOSIS — I10 ESSENTIAL HYPERTENSION: ICD-10-CM

## 2023-01-26 RX ORDER — VARENICLINE TARTRATE
KIT
Qty: 53 EACH | Refills: 0 | Status: SHIPPED | OUTPATIENT
Start: 2023-01-26

## 2023-01-26 RX ORDER — VARENICLINE TARTRATE
KIT
Qty: 53 EACH | Refills: 0 | Status: SHIPPED | OUTPATIENT
Start: 2023-01-26 | End: 2023-01-26

## 2023-01-26 ASSESSMENT — PATIENT HEALTH QUESTIONNAIRE - PHQ9
SUM OF ALL RESPONSES TO PHQ QUESTIONS 1-9: 0
SUM OF ALL RESPONSES TO PHQ QUESTIONS 1-9: 0
SUM OF ALL RESPONSES TO PHQ9 QUESTIONS 1 & 2: 0
SUM OF ALL RESPONSES TO PHQ QUESTIONS 1-9: 0
2. FEELING DOWN, DEPRESSED OR HOPELESS: 0
SUM OF ALL RESPONSES TO PHQ QUESTIONS 1-9: 0
1. LITTLE INTEREST OR PLEASURE IN DOING THINGS: 0

## 2023-01-26 NOTE — PROGRESS NOTES
Chief Complaint   Patient presents with    Pre-op Exam     CREATION, BYPASS, ARTERIAL, AORTA TO FEMORAL, BILATERAL, 2/1/23, Dr Natalya Jones, [id number],[phone] (Fax)          Patient Name: Qian Kim  YOB: 1965    This patient presents to the office today for a preoperative consultation at the request of surgeon, Dr. Hien Costa, who plans on performing Aorto Bifemoral Bypass on February 14 at [facility].  Hypertension under control with current medication, on atorvastatin for hyperlipidemia and recent LDL 62. No history of stroke. Patient does not have any major cardiac contraindications to surgery, no recent acs, no decompensated heart failure, no uncontrolled arrythmia or major valvular heart disease. 1/16/2023: Had a cardiac clearance from his cardiologist Dr. Anshu Mcintosh. Cardiac stress test did not show significant abnormality. Still smokes half a pack a day and encouraged to quit smoking at least 1 week before surgery and interested to retry Chantix. Planned anesthesia: General   Known anesthesia problems: None   Bleeding risk: No recent or remote history of abnormal bleeding  Personal or FH ofDVT/PE: No      Patient Active Problem List   Diagnosis    Essential hypertension    Mixed hyperlipidemia    PAD (peripheral artery disease) ([facility] [address]     Past Surgical History:   Procedure Laterality Date    CYSTOSCOPY  04/12/2012    With right ureteral stent due to kidney stone by Dr. Emilee Cerda    Cystoscopy with ureteroscopy    PANCREAS SURGERY  2005    Partial pancreatectomy due to pseudocyst    SPLENECTOMY, TOTAL  2005       Allergies   Allergen Reactions    Bee Venom Swelling and Other (See Comments)     Swelling and sweats.       Current Outpatient Medications   Medication Sig Dispense Refill    Varenicline Tartrate, Starter, (CHANTIX STARTING MONTH PAK) 0.5 MG X 11 & 1 MG X 42 TBPK As directed 53 each 0    amLODIPine (NORVASC) 10 MG tablet 10 mg daily atorvastatin (LIPITOR) 40 MG tablet Take 1 tablet by mouth daily 90 tablet 3    lisinopril (PRINIVIL;ZESTRIL) 10 MG tablet Take 1 tablet by mouth daily 90 tablet 3    aspirin 81 MG EC tablet Take 81 mg by mouth daily       No current facility-administered medications for this visit. Social History     Tobacco Use    Smoking status: Some Days     Packs/day: 0.50     Years: 35.00     Pack years: 17.50     Types: Cigarettes    Smokeless tobacco: Never   Substance Use Topics    Alcohol use: Never     Family History   Problem Relation Age of Onset    Hypertension Mother     Diabetes Mother     COPD Mother     No Known Problems Father     No Known Problems Sister     No Known Problems Sister     No Known Problems Sister     No Known Problems Brother        Objective:     /72   Pulse 78   Wt 138 lb (62.6 kg)   SpO2 97%   BMI 19.25 kg/m²  Weight: 138 lb (62.6 kg)   Physical Exam  General Appearance: Alert, cooperative, no distress, appears stated age, walks with a cane  HEENT: Normocephalic, PERRL, conjunctiva/sclera clear, EOM intact, TM's clear, oropharynx and nasopharynx clear   Neck: Supple, symmetrical, trachea midline, no adenopathy; thyroid: no enlargement/tenderness/nodules; no carotid bruit or JVD   Back: Symmetric, no curvature, ROM normal, no CVA tenderness   Lungs: Clear to auscultation bilaterally, respirations unlabored   Chest Wall: No tenderness or deformity   Heart: Regular rate and rhythm, S1 and S2 normal, no murmur, rub or gallop   Abdomen: Soft, non-tender, bowel sounds active all four quadrants, no masses, no organomegaly   Extremities: Good range of motion, no edema  Neurologic: CNII-XII intact, normal strength, sensation and reflexes throughout         Assessment:       Man was seen today for pre-op exam.    Diagnoses and all orders for this visit:    Preop examination    Claudication of both lower extremities (HCC)    Essential hypertension  Blood pressure stable at 118/72. Continue lisinopril 10 mg daily. Current smoker  Patient will try Chantix to help him quit smoking before surgery. -     Varenicline Tartrate, Starter, (CHANTIX STARTING MONTH LYDIA) 0.5 MG X 11 & 1 MG X 42 TBPK; As directed    61 y.o. patient  approved for Surgery         Plan:     1. Preoperative workup as follows: scheduled for PAT on 02/02/2023  2. Change in medication regimen before surgery:None, patient has not started Eliquis due to cost  3. No contraindications to planned surgery  4. Patient is an acceptable cardiac risk for surgery, had cardiac clearance from his cardiologist.    Return in about 3 months (around 4/26/2023) for HTN. Electronically signed by Liliana Broussard MD on 1/26/2023 at 4:42 PM.    This dictation was generated by voice recognition computer software. Although all attempts are made to edit the dictation for accuracy, there may be errors in the transcription that are not intended.

## 2023-02-21 ENCOUNTER — OFFICE VISIT (OUTPATIENT)
Dept: PRIMARY CARE CLINIC | Age: 58
End: 2023-02-21
Payer: MEDICARE

## 2023-02-21 VITALS
DIASTOLIC BLOOD PRESSURE: 62 MMHG | HEART RATE: 98 BPM | BODY MASS INDEX: 19.11 KG/M2 | SYSTOLIC BLOOD PRESSURE: 118 MMHG | WEIGHT: 137 LBS | OXYGEN SATURATION: 98 %

## 2023-02-21 DIAGNOSIS — Z95.828 S/P AORTO-BIFEMORAL BYPASS SURGERY: Primary | ICD-10-CM

## 2023-02-21 DIAGNOSIS — D62 ANEMIA DUE TO ACUTE BLOOD LOSS: ICD-10-CM

## 2023-02-21 DIAGNOSIS — E83.51 HYPOCALCEMIA: ICD-10-CM

## 2023-02-21 DIAGNOSIS — E83.39 LOW PHOSPHATE LEVELS: ICD-10-CM

## 2023-02-21 LAB
ANION GAP SERPL CALCULATED.3IONS-SCNC: 12 MMOL/L (ref 3–16)
BASOPHILS ABSOLUTE: 0.1 K/UL (ref 0–0.2)
BASOPHILS RELATIVE PERCENT: 0.8 %
BUN BLDV-MCNC: 9 MG/DL (ref 7–20)
CALCIUM SERPL-MCNC: 8.8 MG/DL (ref 8.3–10.6)
CHLORIDE BLD-SCNC: 104 MMOL/L (ref 99–110)
CO2: 28 MMOL/L (ref 21–32)
CREAT SERPL-MCNC: 0.9 MG/DL (ref 0.9–1.3)
EOSINOPHILS ABSOLUTE: 0.3 K/UL (ref 0–0.6)
EOSINOPHILS RELATIVE PERCENT: 3.6 %
GFR SERPL CREATININE-BSD FRML MDRD: >60 ML/MIN/{1.73_M2}
GLUCOSE BLD-MCNC: 107 MG/DL (ref 70–99)
HCT VFR BLD CALC: 29 % (ref 40.5–52.5)
HEMOGLOBIN: 9.5 G/DL (ref 13.5–17.5)
LYMPHOCYTES ABSOLUTE: 2 K/UL (ref 1–5.1)
LYMPHOCYTES RELATIVE PERCENT: 21.3 %
MCH RBC QN AUTO: 33.2 PG (ref 26–34)
MCHC RBC AUTO-ENTMCNC: 32.9 G/DL (ref 31–36)
MCV RBC AUTO: 101 FL (ref 80–100)
MONOCYTES ABSOLUTE: 1.1 K/UL (ref 0–1.3)
MONOCYTES RELATIVE PERCENT: 11.6 %
NEUTROPHILS ABSOLUTE: 6 K/UL (ref 1.7–7.7)
NEUTROPHILS RELATIVE PERCENT: 62.7 %
PDW BLD-RTO: 14.6 % (ref 12.4–15.4)
PHOSPHORUS: 3.7 MG/DL (ref 2.5–4.9)
PLATELET # BLD: 390 K/UL (ref 135–450)
PMV BLD AUTO: 10.4 FL (ref 5–10.5)
POTASSIUM SERPL-SCNC: 3.9 MMOL/L (ref 3.5–5.1)
RBC # BLD: 2.87 M/UL (ref 4.2–5.9)
SODIUM BLD-SCNC: 144 MMOL/L (ref 136–145)
WBC # BLD: 9.6 K/UL (ref 4–11)

## 2023-02-21 PROCEDURE — 3078F DIAST BP <80 MM HG: CPT | Performed by: FAMILY MEDICINE

## 2023-02-21 PROCEDURE — G8484 FLU IMMUNIZE NO ADMIN: HCPCS | Performed by: FAMILY MEDICINE

## 2023-02-21 PROCEDURE — 99214 OFFICE O/P EST MOD 30 MIN: CPT | Performed by: FAMILY MEDICINE

## 2023-02-21 PROCEDURE — G8427 DOCREV CUR MEDS BY ELIG CLIN: HCPCS | Performed by: FAMILY MEDICINE

## 2023-02-21 PROCEDURE — 3017F COLORECTAL CA SCREEN DOC REV: CPT | Performed by: FAMILY MEDICINE

## 2023-02-21 PROCEDURE — G8420 CALC BMI NORM PARAMETERS: HCPCS | Performed by: FAMILY MEDICINE

## 2023-02-21 PROCEDURE — 3074F SYST BP LT 130 MM HG: CPT | Performed by: FAMILY MEDICINE

## 2023-02-21 PROCEDURE — 4004F PT TOBACCO SCREEN RCVD TLK: CPT | Performed by: FAMILY MEDICINE

## 2023-02-21 RX ORDER — ACETAMINOPHEN 500 MG
1000 TABLET ORAL EVERY 8 HOURS
COMMUNITY
Start: 2023-02-16 | End: 2023-03-18

## 2023-02-21 RX ORDER — GABAPENTIN 100 MG/1
CAPSULE ORAL
COMMUNITY
Start: 2023-02-17

## 2023-02-21 RX ORDER — APIXABAN 5 MG/1
TABLET, FILM COATED ORAL
COMMUNITY
Start: 2023-01-05

## 2023-02-21 RX ORDER — METHOCARBAMOL 500 MG/1
TABLET, FILM COATED ORAL
COMMUNITY
Start: 2023-02-17

## 2023-02-21 ASSESSMENT — PATIENT HEALTH QUESTIONNAIRE - PHQ9
SUM OF ALL RESPONSES TO PHQ QUESTIONS 1-9: 2
2. FEELING DOWN, DEPRESSED OR HOPELESS: 0
SUM OF ALL RESPONSES TO PHQ QUESTIONS 1-9: 2
1. LITTLE INTEREST OR PLEASURE IN DOING THINGS: 2
SUM OF ALL RESPONSES TO PHQ9 QUESTIONS 1 & 2: 2

## 2023-02-21 NOTE — PROGRESS NOTES
Chief Complaint   Patient presents with    Post-Op Check     Pt had surgery on 2/14/23. Pt here for a post op check. No complaints           Subjective:       Shirley Bailey is a 61 y.o. male here for postop check after a aortobifemoral bypass on February 14. Patient was advised to follow-up with his PCP to recheck his anemia, hypocalcemia and low phosphorus level. Patient aware that he lost a lot of blood with the procedure at least 1000 L but was able to recover at least 700 mL that was transfused back to him. Patient still weak, no energy. No fever or chills. The wife is the one changing the dressing and so far no sign of infection at the incision sites. Patient still smokes but only 4 cigarettes a day. Chantix was not covered by his insurance. Patient denies any signs of external bleeding being on Eliquis and aspirin. Has a postop appointment with the surgeon on March 2. Reviewed blood test done on 2/16/2023: WBC 14.8, hemoglobin 9.5, hematocrit 28.2, platelets 446, sodium 137, potassium 4.0, creatinine 0.91, glucose 111, calcium 8.0, albumin 3.1, phosphorus 2.1      Current Outpatient Medications   Medication Sig Dispense Refill    methocarbamol (ROBAXIN) 500 MG tablet       gabapentin (NEURONTIN) 100 MG capsule       ELIQUIS 5 MG TABS tablet       acetaminophen (TYLENOL) 500 MG tablet Take 1,000 mg by mouth in the morning and 1,000 mg at noon and 1,000 mg in the evening. bisacodyl (DULCOLAX) 5 MG EC tablet Take 5 mg by mouth daily as needed      amLODIPine (NORVASC) 10 MG tablet 10 mg daily      atorvastatin (LIPITOR) 40 MG tablet Take 1 tablet by mouth daily 90 tablet 3    lisinopril (PRINIVIL;ZESTRIL) 10 MG tablet Take 1 tablet by mouth daily 90 tablet 3    aspirin 81 MG EC tablet Take 81 mg by mouth daily       No current facility-administered medications for this visit. Allergies   Allergen Reactions    Bee Venom Swelling and Other (See Comments)     Swelling and sweats. Objective:   /62   Pulse 98   Wt 137 lb (62.1 kg)   SpO2 98%   BMI 19.11 kg/m²   Alert oriented, NAD, walks with a cane  HEENT: unremarkable  Chest/Lungs: clear to ausculation, no wheezing/rales  Heart: RR, normal S1S2, no murmur  Abdomen: (+) dressing  Extremities: able to move all 4 extremities (+) dressing      Assessment/Plan:   1. S/P aorto-bifemoral bypass surgery  Keep the appointment with the vascular surgeon on March 2. Continue dressing changes as recommended by the surgeon. Watch for signs and symptoms of infection. 2. Anemia due to acute blood loss  Check CBC level. - CBC with Auto Differential; Future    3. Hypocalcemia  Start taking calcium supplement, can try Tums 1 tablet twice a day. - Basic Metabolic Panel; Future    4. Low phosphate levels  Check level to see if it requires replacement. - Phosphorus; Future    Return in about 4 weeks (around 3/21/2023) for anemia f/u. Electronically Signed: Electronically signed by Nicholas Farah MD on 2/21/2023 at 1:17 PM EST      This dictation was generated by voice recognition computer software. Although all attempts are made to edit the dictation for accuracy, there may be errors in the transcription that are not intended.

## 2023-02-27 NOTE — TELEPHONE ENCOUNTER
Recent Visits  Date Type Provider Dept   02/21/23 Office Visit Georgia Roldan MD [facility]   01/26/23 Office Visit Georgia Roldan MD [facility]   09/09/22 Office Visit Georgia Roldan MD [facility] [address] recent visits within past 540 days with a meds authorizing provider and meeting all other requirements  Future Appointments  Date Type Provider Dept   03/27/23 Appointment Georgia Roldan MD [facility]   Showing future appointments within next 150 days with a meds authorizing provider and meeting all other requirements

## 2023-03-06 RX ORDER — AMLODIPINE BESYLATE 10 MG/1
TABLET ORAL
Qty: 30 TABLET | Refills: 5 | Status: SHIPPED | OUTPATIENT
Start: 2023-03-06

## 2023-05-11 DIAGNOSIS — D62 ANEMIA DUE TO ACUTE BLOOD LOSS: ICD-10-CM

## 2023-05-11 DIAGNOSIS — E83.51 LOW CALCIUM LEVELS: ICD-10-CM

## 2023-05-11 LAB
ANION GAP SERPL CALCULATED.3IONS-SCNC: 13 MMOL/L (ref 3–16)
BASOPHILS # BLD: 0.1 K/UL (ref 0–0.2)
BASOPHILS NFR BLD: 0.9 %
BUN SERPL-MCNC: 14 MG/DL (ref 7–20)
CALCIUM SERPL-MCNC: 9.5 MG/DL (ref 8.3–10.6)
CHLORIDE SERPL-SCNC: 106 MMOL/L (ref 99–110)
CO2 SERPL-SCNC: 23 MMOL/L (ref 21–32)
CREAT SERPL-MCNC: 1 MG/DL (ref 0.9–1.3)
DEPRECATED RDW RBC AUTO: 15 % (ref 12.4–15.4)
EOSINOPHIL # BLD: 0.1 K/UL (ref 0–0.6)
EOSINOPHIL NFR BLD: 1.5 %
GFR SERPLBLD CREATININE-BSD FMLA CKD-EPI: >60 ML/MIN/{1.73_M2}
GLUCOSE SERPL-MCNC: 140 MG/DL (ref 70–99)
HCT VFR BLD AUTO: 42.1 % (ref 40.5–52.5)
HGB BLD-MCNC: 13.9 G/DL (ref 13.5–17.5)
IRON SATN MFR SERPL: 20 % (ref 20–50)
IRON SERPL-MCNC: 52 UG/DL (ref 59–158)
LYMPHOCYTES # BLD: 2.6 K/UL (ref 1–5.1)
LYMPHOCYTES NFR BLD: 40.5 %
MCH RBC QN AUTO: 33.9 PG (ref 26–34)
MCHC RBC AUTO-ENTMCNC: 32.9 G/DL (ref 31–36)
MCV RBC AUTO: 103.1 FL (ref 80–100)
MONOCYTES # BLD: 0.9 K/UL (ref 0–1.3)
MONOCYTES NFR BLD: 13.1 %
NEUTROPHILS # BLD: 2.9 K/UL (ref 1.7–7.7)
NEUTROPHILS NFR BLD: 44 %
PLATELET # BLD AUTO: 205 K/UL (ref 135–450)
PLATELET BLD QL SMEAR: ADEQUATE
PMV BLD AUTO: 12 FL (ref 5–10.5)
POTASSIUM SERPL-SCNC: 4 MMOL/L (ref 3.5–5.1)
RBC # BLD AUTO: 4.09 M/UL (ref 4.2–5.9)
SLIDE REVIEW: ABNORMAL
SODIUM SERPL-SCNC: 142 MMOL/L (ref 136–145)
TIBC SERPL-MCNC: 260 UG/DL (ref 260–445)
WBC # BLD AUTO: 6.5 K/UL (ref 4–11)

## 2023-05-12 LAB — FERRITIN SERPL IA-MCNC: 59.7 NG/ML (ref 30–400)

## 2023-05-19 ENCOUNTER — OFFICE VISIT (OUTPATIENT)
Dept: PRIMARY CARE CLINIC | Age: 58
End: 2023-05-19

## 2023-05-19 VITALS
SYSTOLIC BLOOD PRESSURE: 130 MMHG | BODY MASS INDEX: 21.5 KG/M2 | DIASTOLIC BLOOD PRESSURE: 76 MMHG | HEIGHT: 67 IN | HEART RATE: 65 BPM | WEIGHT: 137 LBS | OXYGEN SATURATION: 98 %

## 2023-05-19 DIAGNOSIS — I10 ESSENTIAL HYPERTENSION: Primary | ICD-10-CM

## 2023-05-19 DIAGNOSIS — Z90.81 H/O SPLENECTOMY: ICD-10-CM

## 2023-05-19 DIAGNOSIS — D62 ANEMIA DUE TO ACUTE BLOOD LOSS: ICD-10-CM

## 2023-05-19 ASSESSMENT — PATIENT HEALTH QUESTIONNAIRE - PHQ9
SUM OF ALL RESPONSES TO PHQ QUESTIONS 1-9: 0
SUM OF ALL RESPONSES TO PHQ9 QUESTIONS 1 & 2: 0
1. LITTLE INTEREST OR PLEASURE IN DOING THINGS: 0
2. FEELING DOWN, DEPRESSED OR HOPELESS: 0

## 2023-05-19 NOTE — PROGRESS NOTES
PROGRESS NOTE  Date of Service:  5/19/2023    Chief Complaint   Patient presents with    Hypertension    Check-Up      6 weeks (around 5/26/2023) for HTN/anemia       HPI:  Marcela Hood is a 61 y.o. male for hypertension and anemia follow-up. Patient happy to report that he has not been back smoking since his Fem-Pop surgery. His blood pressure stays stable off lisinopril. Patient feels good today except for legs are still stiff. Denies any chest pains, shortness of breath, melena or hematochezia. Patient is also due for meningitis and pneumonia vaccination due to history of splenectomy. Reviewed blood test done on 5/11/2023: Sodium 142, potassium 4.0, glucose 140, creatinine 1.0, iron 52, TIBC 260, ferritin level 59.7, WBC 6.5, hemoglobin 13.9 (from 9.5), hematocrit 42.1, .1, platelets 055    Current Outpatient Medications   Medication Sig Dispense Refill    amLODIPine (NORVASC) 10 MG tablet TAKE ONE TABLET BY MOUTH DAILY 30 tablet 5    ELIQUIS 5 MG TABS tablet       atorvastatin (LIPITOR) 40 MG tablet Take 1 tablet by mouth daily 90 tablet 3    aspirin 81 MG EC tablet Take 1 tablet by mouth daily       No current facility-administered medications for this visit. Allergies   Allergen Reactions    Bee Venom Swelling and Other (See Comments)     Swelling and sweats.          Past Medical History:   Diagnosis Date    Acute pancreatitis 2005    Breast mass, left 12/17/2018    Chronic low back pain     History of chicken pox     Hyperlipidemia     Hypertension     Kidney stones 1990        Past Surgical History:   Procedure Laterality Date    AORTA - BILATERAL FEMORAL ARTERY BYPASS GRAFT  02/14/2023    by Dr. Bryant Blade  04/12/2012    With right ureteral stent due to kidney stone by Dr. Morgan Rodriguez    Cystoscopy with ureteroscopy    PANCREAS SURGERY  2005    Partial pancreatectomy due to pseudocyst    SPLENECTOMY, TOTAL  2005          OBJECTIVE:  /76

## 2023-09-26 ENCOUNTER — OFFICE VISIT (OUTPATIENT)
Dept: PRIMARY CARE CLINIC | Age: 58
End: 2023-09-26
Payer: MEDICARE

## 2023-09-26 VITALS
BODY MASS INDEX: 21.03 KG/M2 | OXYGEN SATURATION: 97 % | SYSTOLIC BLOOD PRESSURE: 102 MMHG | HEART RATE: 74 BPM | DIASTOLIC BLOOD PRESSURE: 60 MMHG | WEIGHT: 134 LBS | HEIGHT: 67 IN

## 2023-09-26 DIAGNOSIS — E78.2 MIXED HYPERLIPIDEMIA: ICD-10-CM

## 2023-09-26 DIAGNOSIS — I10 ESSENTIAL HYPERTENSION: ICD-10-CM

## 2023-09-26 DIAGNOSIS — Z23 NEED FOR INFLUENZA VACCINATION: ICD-10-CM

## 2023-09-26 DIAGNOSIS — Z12.11 COLON CANCER SCREENING: ICD-10-CM

## 2023-09-26 DIAGNOSIS — Z23 NEED FOR MENINGOCOCCAL VACCINATION: ICD-10-CM

## 2023-09-26 DIAGNOSIS — Z00.00 MEDICARE ANNUAL WELLNESS VISIT, SUBSEQUENT: Primary | ICD-10-CM

## 2023-09-26 PROCEDURE — 3017F COLORECTAL CA SCREEN DOC REV: CPT | Performed by: FAMILY MEDICINE

## 2023-09-26 PROCEDURE — 3078F DIAST BP <80 MM HG: CPT | Performed by: FAMILY MEDICINE

## 2023-09-26 PROCEDURE — 90674 CCIIV4 VAC NO PRSV 0.5 ML IM: CPT | Performed by: FAMILY MEDICINE

## 2023-09-26 PROCEDURE — 90734 MENACWYD/MENACWYCRM VACC IM: CPT | Performed by: FAMILY MEDICINE

## 2023-09-26 PROCEDURE — G0439 PPPS, SUBSEQ VISIT: HCPCS | Performed by: FAMILY MEDICINE

## 2023-09-26 PROCEDURE — 3074F SYST BP LT 130 MM HG: CPT | Performed by: FAMILY MEDICINE

## 2023-09-26 PROCEDURE — G0008 ADMIN INFLUENZA VIRUS VAC: HCPCS | Performed by: FAMILY MEDICINE

## 2023-09-26 ASSESSMENT — LIFESTYLE VARIABLES
HOW MANY STANDARD DRINKS CONTAINING ALCOHOL DO YOU HAVE ON A TYPICAL DAY: 1 OR 2
HOW OFTEN DO YOU HAVE A DRINK CONTAINING ALCOHOL: MONTHLY OR LESS

## 2023-09-26 ASSESSMENT — PATIENT HEALTH QUESTIONNAIRE - PHQ9
SUM OF ALL RESPONSES TO PHQ9 QUESTIONS 1 & 2: 0
SUM OF ALL RESPONSES TO PHQ QUESTIONS 1-9: 0
1. LITTLE INTEREST OR PLEASURE IN DOING THINGS: 0
2. FEELING DOWN, DEPRESSED OR HOPELESS: 0

## 2023-09-26 NOTE — PROGRESS NOTES
visit, subsequent    - CBC with Auto Differential; Future    2. Need for influenza vaccination  VIS given. No history of immunization reaction.  - Influenza, FLUCELVAX, (age 10 mo+), IM, Preservative Free, 0.5 mL    3. Colon cancer screening  Refer to GI, Dr. Michael Braxton  - Amb External Referral To Gastroenterology    4. Need for meningococcal vaccination, h/o splenectomy  VIS given. No history of immunization reaction.  - Meningococcal, Jordana Beck, (age 3m-50y), IM    5. Essential hypertension  Blood pressure stable at 102/60, continue Amlodipine 10 mg daily. - Comprehensive Metabolic Panel; Future    6. Mixed hyperlipidemia  On Lipitor 40 mg, LDL <70.   - Lipid Panel; Future  - Comprehensive Metabolic Panel; Future    7. PAD S/P Bilateral Fem-Pop  Continue ASA, Eliquis. F/u with Fr. Carrera    Medications reviewed and updated. Will call with test results. Continue current medications. Return in about 1 year (around 9/26/2024) for Medicare AWV. Recommended screening schedule for the next 5-10 years is provided to the patient in written form: see Patient Instructions/AVS.    Electronically signed by Beth Forrester MD on 9/26/2023 at 5:37 PM.    This dictation was generated by voice recognition computer software. Although all attempts are made to edit the dictation for accuracy, there may be errors in the transcription that are not intended.

## 2023-10-10 DIAGNOSIS — I10 ESSENTIAL HYPERTENSION: ICD-10-CM

## 2023-10-10 DIAGNOSIS — E78.2 MIXED HYPERLIPIDEMIA: ICD-10-CM

## 2023-10-10 DIAGNOSIS — I73.9 PAD (PERIPHERAL ARTERY DISEASE) (HCC): ICD-10-CM

## 2023-10-11 RX ORDER — LISINOPRIL 10 MG/1
10 TABLET ORAL DAILY
Qty: 90 TABLET | Refills: 3 | Status: SHIPPED | OUTPATIENT
Start: 2023-10-11

## 2023-10-11 RX ORDER — ATORVASTATIN CALCIUM 40 MG/1
40 TABLET, FILM COATED ORAL DAILY
Qty: 90 TABLET | Refills: 3 | Status: SHIPPED | OUTPATIENT
Start: 2023-10-11

## 2024-04-01 ENCOUNTER — TELEPHONE (OUTPATIENT)
Dept: PRIMARY CARE CLINIC | Age: 59
End: 2024-04-01

## 2024-04-01 NOTE — TELEPHONE ENCOUNTER
Pt's wife wanted to speak with provider about his current medications.  They have questions on doasges and how he should take the meds.  Please advise.

## 2024-04-05 ENCOUNTER — TELEPHONE (OUTPATIENT)
Dept: PRIMARY CARE CLINIC | Age: 59
End: 2024-04-05

## 2024-04-05 NOTE — TELEPHONE ENCOUNTER
Spoke with patient informed to increase lisinopril 20 mg and continue amlodipine call with BP reading 04/09/2024

## 2024-04-05 NOTE — TELEPHONE ENCOUNTER
Onelia ELLIOTT call to informed of patient blood pressure of 158/103 with headache, please advises.

## 2024-04-25 ENCOUNTER — OFFICE VISIT (OUTPATIENT)
Dept: PRIMARY CARE CLINIC | Age: 59
End: 2024-04-25
Payer: MEDICARE

## 2024-04-25 VITALS
HEART RATE: 98 BPM | WEIGHT: 133 LBS | BODY MASS INDEX: 20.83 KG/M2 | SYSTOLIC BLOOD PRESSURE: 118 MMHG | DIASTOLIC BLOOD PRESSURE: 74 MMHG | OXYGEN SATURATION: 98 %

## 2024-04-25 VITALS
DIASTOLIC BLOOD PRESSURE: 74 MMHG | HEART RATE: 98 BPM | SYSTOLIC BLOOD PRESSURE: 118 MMHG | OXYGEN SATURATION: 98 % | WEIGHT: 133 LBS | BODY MASS INDEX: 20.83 KG/M2

## 2024-04-25 DIAGNOSIS — Z09 HOSPITAL DISCHARGE FOLLOW-UP: Primary | ICD-10-CM

## 2024-04-25 DIAGNOSIS — I10 ESSENTIAL HYPERTENSION: ICD-10-CM

## 2024-04-25 DIAGNOSIS — E78.2 MIXED HYPERLIPIDEMIA: ICD-10-CM

## 2024-04-25 DIAGNOSIS — G25.0 ESSENTIAL TREMOR: ICD-10-CM

## 2024-04-25 DIAGNOSIS — Z00.00 MEDICARE ANNUAL WELLNESS VISIT, SUBSEQUENT: Primary | ICD-10-CM

## 2024-04-25 PROCEDURE — 3017F COLORECTAL CA SCREEN DOC REV: CPT | Performed by: FAMILY MEDICINE

## 2024-04-25 PROCEDURE — 4004F PT TOBACCO SCREEN RCVD TLK: CPT | Performed by: FAMILY MEDICINE

## 2024-04-25 PROCEDURE — 3078F DIAST BP <80 MM HG: CPT | Performed by: FAMILY MEDICINE

## 2024-04-25 PROCEDURE — 3074F SYST BP LT 130 MM HG: CPT | Performed by: FAMILY MEDICINE

## 2024-04-25 PROCEDURE — G8420 CALC BMI NORM PARAMETERS: HCPCS | Performed by: FAMILY MEDICINE

## 2024-04-25 PROCEDURE — 99213 OFFICE O/P EST LOW 20 MIN: CPT | Performed by: FAMILY MEDICINE

## 2024-04-25 PROCEDURE — G8427 DOCREV CUR MEDS BY ELIG CLIN: HCPCS | Performed by: FAMILY MEDICINE

## 2024-04-25 PROCEDURE — 1111F DSCHRG MED/CURRENT MED MERGE: CPT | Performed by: FAMILY MEDICINE

## 2024-04-25 PROCEDURE — G0439 PPPS, SUBSEQ VISIT: HCPCS | Performed by: FAMILY MEDICINE

## 2024-04-25 RX ORDER — PANTOPRAZOLE SODIUM 40 MG/1
40 TABLET, DELAYED RELEASE ORAL
COMMUNITY
Start: 2024-04-03 | End: 2024-05-03

## 2024-04-25 RX ORDER — OXYCODONE HYDROCHLORIDE AND ACETAMINOPHEN 5; 325 MG/1; MG/1
1-2 TABLET ORAL EVERY 6 HOURS PRN
COMMUNITY
Start: 2024-04-10 | End: 2024-05-10

## 2024-04-25 RX ORDER — ACETAMINOPHEN 325 MG/1
650 TABLET ORAL EVERY 4 HOURS PRN
COMMUNITY
Start: 2024-04-02 | End: 2024-05-02

## 2024-04-25 RX ORDER — PRIMIDONE 50 MG/1
50 TABLET ORAL NIGHTLY
Qty: 30 TABLET | Refills: 3 | Status: SHIPPED | OUTPATIENT
Start: 2024-04-25

## 2024-04-25 SDOH — ECONOMIC STABILITY: INCOME INSECURITY: HOW HARD IS IT FOR YOU TO PAY FOR THE VERY BASICS LIKE FOOD, HOUSING, MEDICAL CARE, AND HEATING?: NOT HARD AT ALL

## 2024-04-25 SDOH — ECONOMIC STABILITY: FOOD INSECURITY: WITHIN THE PAST 12 MONTHS, THE FOOD YOU BOUGHT JUST DIDN'T LAST AND YOU DIDN'T HAVE MONEY TO GET MORE.: NEVER TRUE

## 2024-04-25 SDOH — ECONOMIC STABILITY: HOUSING INSECURITY
IN THE LAST 12 MONTHS, WAS THERE A TIME WHEN YOU DID NOT HAVE A STEADY PLACE TO SLEEP OR SLEPT IN A SHELTER (INCLUDING NOW)?: NO

## 2024-04-25 SDOH — ECONOMIC STABILITY: FOOD INSECURITY: WITHIN THE PAST 12 MONTHS, YOU WORRIED THAT YOUR FOOD WOULD RUN OUT BEFORE YOU GOT MONEY TO BUY MORE.: NEVER TRUE

## 2024-04-25 ASSESSMENT — PATIENT HEALTH QUESTIONNAIRE - PHQ9
SUM OF ALL RESPONSES TO PHQ QUESTIONS 1-9: 0
1. LITTLE INTEREST OR PLEASURE IN DOING THINGS: NOT AT ALL
SUM OF ALL RESPONSES TO PHQ9 QUESTIONS 1 & 2: 0
2. FEELING DOWN, DEPRESSED OR HOPELESS: NOT AT ALL
SUM OF ALL RESPONSES TO PHQ QUESTIONS 1-9: 0

## 2024-04-25 ASSESSMENT — LIFESTYLE VARIABLES
HOW OFTEN DO YOU HAVE A DRINK CONTAINING ALCOHOL: NEVER
HOW MANY STANDARD DRINKS CONTAINING ALCOHOL DO YOU HAVE ON A TYPICAL DAY: PATIENT DOES NOT DRINK

## 2024-04-25 NOTE — PROGRESS NOTES
Chief Complaint   Patient presents with    Medicare AWV    Follow-Up from Hospital       Subjective:       Man Shannon is a 58 y.o. male for annual Medicare AWV and posthospital follow-up.  Patient had a mechanical fall on March 31 and sustained close displaced fracture of the right femoral neck,  status post right hip pinning on 04/01/2024 by Dr. Smiley. Getting home PT 2x/week.  Doing well with his therapy.  Also wants to know if there is something can be done for his tremor, his mother had really bad essential tremor.    Reviewed blood test done on 4/3/2024: WBC 9.4, hemoglobin 13.0, hematocrit 39.0, platelets 202, sodium 141, potassium 4.6, chloride 108, glucose 96, creatinine 1.07, calcium 9.1    Current Outpatient Medications   Medication Sig Dispense Refill    oxyCODONE-acetaminophen (PERCOCET) 5-325 MG per tablet Take 1-2 tablets by mouth every 6 hours as needed.      pantoprazole (PROTONIX) 40 MG tablet Take 1 tablet by mouth      acetaminophen (TYLENOL) 325 MG tablet Take 2 tablets by mouth every 4 hours as needed      primidone (MYSOLINE) 50 MG tablet Take 1 tablet by mouth nightly 30 tablet 3    atorvastatin (LIPITOR) 40 MG tablet TAKE ONE TABLET BY MOUTH DAILY 90 tablet 3    lisinopril (PRINIVIL;ZESTRIL) 10 MG tablet TAKE ONE TABLET BY MOUTH DAILY 90 tablet 3    amLODIPine (NORVASC) 10 MG tablet TAKE ONE TABLET BY MOUTH DAILY 30 tablet 5    aspirin 81 MG EC tablet Take 1 tablet by mouth daily 325 mg currently due to surgery       No current facility-administered medications for this visit.      Allergies   Allergen Reactions    Bee Venom Swelling and Other (See Comments)     Swelling and sweats.        Objective:   /74   Pulse 98   Wt 60.3 kg (133 lb)   SpO2 98%   BMI 20.83 kg/m²   Alert oriented, NAD, walks with a walker  HEENT: unremarkable  Chest/Lungs: clear to ausculation, no wheezing/rales  Heart: RR, normal S1S2, no murmur  Extremities: good ROM, good pulses, no

## 2024-04-25 NOTE — PATIENT INSTRUCTIONS
you take aspirin and not another kind of pain reliever, such as acetaminophen (Tylenol).   When should you call for help?   Call 911 if you have symptoms of a heart attack. These may include:    Chest pain or pressure, or a strange feeling in the chest.     Sweating.     Shortness of breath.     Pain, pressure, or a strange feeling in the back, neck, jaw, or upper belly or in one or both shoulders or arms.     Lightheadedness or sudden weakness.     A fast or irregular heartbeat.   After you call 911, the  may tell you to chew 1 adult-strength or 2 to 4 low-dose aspirin. Wait for an ambulance. Do not try to drive yourself.  Watch closely for changes in your health, and be sure to contact your doctor if you have any problems.  Where can you learn more?  Go to https://www.KeepGo.net/patientEd and enter F075 to learn more about \"A Healthy Heart: Care Instructions.\"  Current as of: June 24, 2023               Content Version: 14.0  © 0842-4690 [employer].   Care instructions adapted under license by [employer]. If you have questions about a medical condition or this instruction, always ask your healthcare professional. [employer] disclaims any warranty or liability for your use of this information.      Personalized Preventive Plan for Man Shannon - 4/25/2024  Medicare offers a range of preventive health benefits. Some of the tests and screenings are paid in full while other may be subject to a deductible, co-insurance, and/or copay.    Some of these benefits include a comprehensive review of your medical history including lifestyle, illnesses that may run in your family, and various assessments and screenings as appropriate.    After reviewing your medical record and screening and assessments performed today your provider may have ordered immunizations, labs, imaging, and/or referrals for you.  A list of these orders (if applicable) as well as your Preventive Care list are

## 2024-04-26 LAB
ALBUMIN SERPL-MCNC: 4.7 G/DL (ref 3.4–5)
ALP SERPL-CCNC: 104 U/L (ref 40–129)
ALT SERPL-CCNC: 11 U/L (ref 10–40)
AST SERPL-CCNC: 15 U/L (ref 15–37)
BILIRUB DIRECT SERPL-MCNC: <0.2 MG/DL (ref 0–0.3)
BILIRUB INDIRECT SERPL-MCNC: NORMAL MG/DL (ref 0–1)
BILIRUB SERPL-MCNC: 0.3 MG/DL (ref 0–1)
CHOLEST SERPL-MCNC: 171 MG/DL (ref 0–199)
HDLC SERPL-MCNC: 52 MG/DL (ref 40–60)
LDLC SERPL CALC-MCNC: 96 MG/DL
PROT SERPL-MCNC: 7.3 G/DL (ref 6.4–8.2)
TRIGL SERPL-MCNC: 114 MG/DL (ref 0–150)
VLDLC SERPL CALC-MCNC: 23 MG/DL

## 2024-06-04 RX ORDER — AMLODIPINE BESYLATE 10 MG/1
10 TABLET ORAL DAILY
Qty: 30 TABLET | Refills: 5 | Status: SHIPPED | OUTPATIENT
Start: 2024-06-04

## 2024-06-04 NOTE — TELEPHONE ENCOUNTER
Pt requested refill on     amLODIPine (NORVASC) 10 MG tablet     [facility] [id number] - [address] [place] [phone]

## 2024-08-01 ENCOUNTER — TELEPHONE (OUTPATIENT)
Dept: PRIMARY CARE CLINIC | Age: 59
End: 2024-08-01

## 2024-08-01 DIAGNOSIS — U07.1 COVID-19 VIRUS INFECTION: Primary | ICD-10-CM

## 2024-08-01 NOTE — TELEPHONE ENCOUNTER
Patient tested positive for Covid.  Patient had a cough, fever and weakness for 2 days.  He requested for medication to be sent to the pharmacy.       [facility] [id number] - [place] [id number] [place] [phone]   
Send molnupiravir 800 mg twice a day for 5 days and Mucinex 600 mg twice a day for 10 days  
Spoke with the pt. Pt informed of medication   
- c/w home synthroid 75mcg  - Obtain TSH level

## 2024-10-07 DIAGNOSIS — G25.0 ESSENTIAL TREMOR: ICD-10-CM

## 2024-10-08 RX ORDER — PRIMIDONE 50 MG/1
50 TABLET ORAL NIGHTLY
Qty: 30 TABLET | Refills: 3 | Status: SHIPPED | OUTPATIENT
Start: 2024-10-08

## 2024-11-05 ENCOUNTER — OFFICE VISIT (OUTPATIENT)
Dept: PRIMARY CARE CLINIC | Age: 59
End: 2024-11-05

## 2024-11-05 VITALS
SYSTOLIC BLOOD PRESSURE: 122 MMHG | WEIGHT: 132 LBS | BODY MASS INDEX: 20.72 KG/M2 | HEART RATE: 70 BPM | HEIGHT: 67 IN | OXYGEN SATURATION: 97 % | DIASTOLIC BLOOD PRESSURE: 68 MMHG

## 2024-11-05 DIAGNOSIS — Z23 NEED FOR VACCINATION: ICD-10-CM

## 2024-11-05 DIAGNOSIS — E78.2 MIXED HYPERLIPIDEMIA: ICD-10-CM

## 2024-11-05 DIAGNOSIS — I10 ESSENTIAL HYPERTENSION: Primary | ICD-10-CM

## 2024-11-05 DIAGNOSIS — Z90.81 S/P SPLENECTOMY: ICD-10-CM

## 2024-11-05 DIAGNOSIS — I73.9 CLAUDICATION OF BOTH LOWER EXTREMITIES (HCC): ICD-10-CM

## 2024-11-05 DIAGNOSIS — I73.9 PAD (PERIPHERAL ARTERY DISEASE) (HCC): ICD-10-CM

## 2024-11-05 RX ORDER — ATORVASTATIN CALCIUM 40 MG/1
40 TABLET, FILM COATED ORAL DAILY
Qty: 90 TABLET | Refills: 3 | Status: SHIPPED | OUTPATIENT
Start: 2024-11-05

## 2024-11-05 RX ORDER — AMLODIPINE BESYLATE 10 MG/1
10 TABLET ORAL DAILY
Qty: 90 TABLET | Refills: 3 | Status: SHIPPED | OUTPATIENT
Start: 2024-11-05 | End: 2025-11-05

## 2024-11-05 RX ORDER — LISINOPRIL 10 MG/1
10 TABLET ORAL DAILY
Qty: 90 TABLET | Refills: 3 | Status: SHIPPED | OUTPATIENT
Start: 2024-11-05

## 2024-11-05 ASSESSMENT — PATIENT HEALTH QUESTIONNAIRE - PHQ9
2. FEELING DOWN, DEPRESSED OR HOPELESS: NOT AT ALL
SUM OF ALL RESPONSES TO PHQ QUESTIONS 1-9: 0
SUM OF ALL RESPONSES TO PHQ9 QUESTIONS 1 & 2: 0
SUM OF ALL RESPONSES TO PHQ QUESTIONS 1-9: 0
1. LITTLE INTEREST OR PLEASURE IN DOING THINGS: NOT AT ALL

## 2024-11-05 NOTE — PROGRESS NOTES
Chief Complaint   Patient presents with    Hypertension    Hyperlipidemia       Subjective:       Man Shannon is a 58 y.o. male here for 6 months follow-up on his hypertension and hyperlipidemia.  Compliant with current medications.  Has PAD and status post femoropopliteal bilateral.  Still smokes half a pack a day.  Patient has difficulty quitting smoking.  Also smokes marijuana.  Patient denies any concerns.  No chest pains, shortness of breath, abdominal pain, melena or hematochezia.    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  Patient will go to local pharmacy for Tdap and shingles vaccine.    Reviewed blood test done on April 25, 2024: Total cholesterol 171, triglyceride 114, HDL 52, LDL 96, ALT 11, AST 15    Current Outpatient Medications   Medication Sig Dispense Refill    atorvastatin (LIPITOR) 40 MG tablet Take 1 tablet by mouth daily 90 tablet 3    lisinopril (PRINIVIL;ZESTRIL) 10 MG tablet Take 1 tablet by mouth daily 90 tablet 3    amLODIPine (NORVASC) 10 MG tablet Take 1 tablet by mouth daily 90 tablet 3    primidone (MYSOLINE) 50 MG tablet TAKE ONE TABLET BY MOUTH ONCE NIGHTLY 30 tablet 3    aspirin 81 MG EC tablet Take 1 tablet by mouth daily 325 mg currently due to surgery       No current facility-administered medications for this visit.      Allergies   Allergen Reactions    Bee Venom Swelling and Other (See Comments)     Swelling and sweats.      Past Medical History:   Diagnosis Date    Acute pancreatitis 2005    Blood type A+ 02/02/2023    Breast mass, left 12/17/2018    Chronic low back pain     Fracture of neck of right femur (HCC) 03/31/2024    s/p pinning by Dr. Smiley on 04/01/2024    History of chicken pox     Hyperlipidemia     Hypertension     Kidney stones 1990      Past Surgical History:   Procedure Laterality Date    AORTA - BILATERAL FEMORAL ARTERY BYPASS GRAFT  02/14/2023    by Dr. Carrera    COLONOSCOPY  03/19/2018    by Dr. Washington, rec 3 years

## 2024-11-06 LAB
ACANTHOCYTES BLD QL SMEAR: ABNORMAL
ALBUMIN SERPL-MCNC: 4.2 G/DL (ref 3.4–5)
ALBUMIN/GLOB SERPL: 2 {RATIO} (ref 1.1–2.2)
ALP SERPL-CCNC: 82 U/L (ref 40–129)
ALT SERPL-CCNC: 12 U/L (ref 10–40)
ANION GAP SERPL CALCULATED.3IONS-SCNC: 12 MMOL/L (ref 3–16)
ANISOCYTOSIS BLD QL SMEAR: ABNORMAL
AST SERPL-CCNC: 17 U/L (ref 15–37)
BASOPHILS # BLD: 0.1 K/UL (ref 0–0.2)
BASOPHILS NFR BLD: 1.6 %
BILIRUB SERPL-MCNC: 0.3 MG/DL (ref 0–1)
BUN SERPL-MCNC: 12 MG/DL (ref 7–20)
BURR CELLS BLD QL SMEAR: ABNORMAL
CALCIUM SERPL-MCNC: 9.6 MG/DL (ref 8.3–10.6)
CHLORIDE SERPL-SCNC: 104 MMOL/L (ref 99–110)
CHOLEST SERPL-MCNC: 130 MG/DL (ref 0–199)
CO2 SERPL-SCNC: 25 MMOL/L (ref 21–32)
CREAT SERPL-MCNC: 1 MG/DL (ref 0.9–1.3)
DEPRECATED RDW RBC AUTO: 14.9 % (ref 12.4–15.4)
EOSINOPHIL # BLD: 0.1 K/UL (ref 0–0.6)
EOSINOPHIL NFR BLD: 1.4 %
GFR SERPLBLD CREATININE-BSD FMLA CKD-EPI: 86 ML/MIN/{1.73_M2}
GLUCOSE SERPL-MCNC: 153 MG/DL (ref 70–99)
HCT VFR BLD AUTO: 39.7 % (ref 40.5–52.5)
HDLC SERPL-MCNC: 40 MG/DL (ref 40–60)
HGB BLD-MCNC: 13.7 G/DL (ref 13.5–17.5)
LDLC SERPL CALC-MCNC: 54 MG/DL
LYMPHOCYTES # BLD: 2.9 K/UL (ref 1–5.1)
LYMPHOCYTES NFR BLD: 36.9 %
MCH RBC QN AUTO: 34.3 PG (ref 26–34)
MCHC RBC AUTO-ENTMCNC: 34.5 G/DL (ref 31–36)
MCV RBC AUTO: 99.5 FL (ref 80–100)
MONOCYTES # BLD: 0.7 K/UL (ref 0–1.3)
MONOCYTES NFR BLD: 9.2 %
NEUTROPHILS # BLD: 4.1 K/UL (ref 1.7–7.7)
NEUTROPHILS NFR BLD: 50.9 %
PLATELET # BLD AUTO: 185 K/UL (ref 135–450)
PLATELET BLD QL SMEAR: ADEQUATE
PMV BLD AUTO: 12.3 FL (ref 5–10.5)
POIKILOCYTOSIS BLD QL SMEAR: ABNORMAL
POTASSIUM SERPL-SCNC: 3.4 MMOL/L (ref 3.5–5.1)
PROT SERPL-MCNC: 6.3 G/DL (ref 6.4–8.2)
RBC # BLD AUTO: 3.99 M/UL (ref 4.2–5.9)
SLIDE REVIEW: ABNORMAL
SODIUM SERPL-SCNC: 141 MMOL/L (ref 136–145)
TRIGL SERPL-MCNC: 180 MG/DL (ref 0–150)
VLDLC SERPL CALC-MCNC: 36 MG/DL
WBC # BLD AUTO: 8 K/UL (ref 4–11)

## 2024-12-09 DIAGNOSIS — I73.9 PAD (PERIPHERAL ARTERY DISEASE) (HCC): ICD-10-CM

## 2024-12-09 DIAGNOSIS — E78.2 MIXED HYPERLIPIDEMIA: ICD-10-CM

## 2024-12-09 DIAGNOSIS — I10 ESSENTIAL HYPERTENSION: ICD-10-CM

## 2024-12-10 RX ORDER — ATORVASTATIN CALCIUM 40 MG/1
40 TABLET, FILM COATED ORAL DAILY
Qty: 90 TABLET | Refills: 3 | Status: SHIPPED | OUTPATIENT
Start: 2024-12-10

## 2024-12-10 RX ORDER — LISINOPRIL 10 MG/1
10 TABLET ORAL DAILY
Qty: 90 TABLET | Refills: 3 | Status: SHIPPED | OUTPATIENT
Start: 2024-12-10

## 2024-12-10 NOTE — TELEPHONE ENCOUNTER
Recent Visits  Date Type Provider Dept   11/05/24 Office Visit Reyes, Eleia J, MD [facility]   04/25/24 Office Visit Reyes, Eleia J, MD [facility]   04/25/24 Office Visit Reyes, Eleia J, MD [facility]   09/26/23 Office Visit Reyes, Eleia J, MD [facility]   Showing recent visits within past 540 days with a meds authorizing provider and meeting all other requirements  Future Appointments  Date Type Provider Dept   04/29/25 Appointment Reyes, Eleia J, MD [facility]   Showing future appointments within next 150 days with a meds authorizing provider and meeting all other requirements

## 2025-03-10 DIAGNOSIS — G25.0 ESSENTIAL TREMOR: ICD-10-CM

## 2025-03-10 RX ORDER — PRIMIDONE 50 MG/1
50 TABLET ORAL NIGHTLY
Qty: 30 TABLET | Refills: 3 | Status: SHIPPED | OUTPATIENT
Start: 2025-03-10

## 2025-03-10 NOTE — TELEPHONE ENCOUNTER
Recent Visits  Date Type Provider Dept   11/05/24 Office Visit Reyes, Eleia J, MD [facility]   04/25/24 Office Visit Reyes, Eleia J, MD [facility]   04/25/24 Office Visit Reyes, Eleia J, MD [facility]   09/26/23 Office Visit Reyes, Eleia J, MD [facility]   Showing recent visits within past 540 days with a meds authorizing provider and meeting all other requirements  Future Appointments  Date Type Provider Dept   04/29/25 Appointment Reyes, Eleia J, MD [facility]   Showing future appointments within next 150 days with a meds authorizing provider and meeting all other requirements     11/5/2024

## 2025-04-22 ENCOUNTER — TELEPHONE (OUTPATIENT)
Dept: PRIMARY CARE CLINIC | Age: 60
End: 2025-04-22

## 2025-04-22 DIAGNOSIS — I73.9 PAD (PERIPHERAL ARTERY DISEASE): ICD-10-CM

## 2025-04-22 DIAGNOSIS — I10 ESSENTIAL HYPERTENSION: ICD-10-CM

## 2025-04-22 DIAGNOSIS — E78.2 MIXED HYPERLIPIDEMIA: ICD-10-CM

## 2025-04-22 NOTE — TELEPHONE ENCOUNTER
Patient requested refills on   lisinopril (PRINIVIL;ZESTRIL) 10 MG tablet   atorvastatin (LIPITOR) 40 MG tablet   amLODIPine (NORVASC) 10 MG tablet   [facility] [id number] [address] [place] [phone]

## 2025-07-10 DIAGNOSIS — G25.0 ESSENTIAL TREMOR: ICD-10-CM

## 2025-07-10 RX ORDER — PRIMIDONE 50 MG/1
TABLET ORAL
Qty: 30 TABLET | Refills: 3 | Status: SHIPPED | OUTPATIENT
Start: 2025-07-10

## 2025-07-10 NOTE — TELEPHONE ENCOUNTER
Recent Visits  Date Type Provider Dept   11/05/24 Office Visit Reyes, Eleia J, MD [facility]   04/25/24 Office Visit Reyes, Eleia J, MD [facility]   04/25/24 Office Visit Reyes, Eleia J, MD [facility]   Showing recent visits within past 540 days with a meds authorizing provider and meeting all other requirements  Future Appointments  No visits were found meeting these conditions.  Showing future appointments within next 150 days with a meds authorizing provider and meeting all other requirements     11/5/2024

## 2025-08-14 ENCOUNTER — OFFICE VISIT (OUTPATIENT)
Dept: PRIMARY CARE CLINIC | Age: 60
End: 2025-08-14
Payer: MEDICARE

## 2025-08-14 VITALS
RESPIRATION RATE: 16 BRPM | HEART RATE: 64 BPM | HEIGHT: 67 IN | OXYGEN SATURATION: 98 % | WEIGHT: 132 LBS | DIASTOLIC BLOOD PRESSURE: 70 MMHG | SYSTOLIC BLOOD PRESSURE: 100 MMHG | BODY MASS INDEX: 20.72 KG/M2

## 2025-08-14 DIAGNOSIS — R73.9 HYPERGLYCEMIA: ICD-10-CM

## 2025-08-14 DIAGNOSIS — Z87.891 PERSONAL HISTORY OF TOBACCO USE: ICD-10-CM

## 2025-08-14 DIAGNOSIS — I10 ESSENTIAL HYPERTENSION: ICD-10-CM

## 2025-08-14 DIAGNOSIS — Z00.00 MEDICARE ANNUAL WELLNESS VISIT, SUBSEQUENT: Primary | ICD-10-CM

## 2025-08-14 DIAGNOSIS — E78.2 MIXED HYPERLIPIDEMIA: ICD-10-CM

## 2025-08-14 PROCEDURE — G0439 PPPS, SUBSEQ VISIT: HCPCS

## 2025-08-14 PROCEDURE — G0296 VISIT TO DETERM LDCT ELIG: HCPCS

## 2025-08-14 PROCEDURE — 3074F SYST BP LT 130 MM HG: CPT

## 2025-08-14 PROCEDURE — 3078F DIAST BP <80 MM HG: CPT

## 2025-08-14 PROCEDURE — 3017F COLORECTAL CA SCREEN DOC REV: CPT

## 2025-08-14 RX ORDER — CLOPIDOGREL BISULFATE 75 MG/1
75 TABLET ORAL DAILY
COMMUNITY
Start: 2025-07-05 | End: 2026-01-01

## 2025-08-14 SDOH — ECONOMIC STABILITY: FOOD INSECURITY: WITHIN THE PAST 12 MONTHS, THE FOOD YOU BOUGHT JUST DIDN'T LAST AND YOU DIDN'T HAVE MONEY TO GET MORE.: NEVER TRUE

## 2025-08-14 SDOH — ECONOMIC STABILITY: FOOD INSECURITY: WITHIN THE PAST 12 MONTHS, YOU WORRIED THAT YOUR FOOD WOULD RUN OUT BEFORE YOU GOT MONEY TO BUY MORE.: NEVER TRUE

## 2025-08-14 ASSESSMENT — PATIENT HEALTH QUESTIONNAIRE - PHQ9
SUM OF ALL RESPONSES TO PHQ QUESTIONS 1-9: 0
SUM OF ALL RESPONSES TO PHQ QUESTIONS 1-9: 0
2. FEELING DOWN, DEPRESSED OR HOPELESS: NOT AT ALL
1. LITTLE INTEREST OR PLEASURE IN DOING THINGS: NOT AT ALL
SUM OF ALL RESPONSES TO PHQ QUESTIONS 1-9: 0
SUM OF ALL RESPONSES TO PHQ QUESTIONS 1-9: 0

## 2025-08-31 ENCOUNTER — HOSPITAL ENCOUNTER (OUTPATIENT)
Dept: CT IMAGING | Age: 60
Discharge: HOME | End: 2025-08-31
Payer: MEDICARE

## 2025-08-31 DIAGNOSIS — Z87.891 PERSONAL HISTORY OF TOBACCO USE: ICD-10-CM

## 2025-08-31 PROCEDURE — 71271 CT THORAX LUNG CANCER SCR C-: CPT
